# Patient Record
Sex: FEMALE | Race: WHITE | NOT HISPANIC OR LATINO | Employment: FULL TIME | URBAN - METROPOLITAN AREA
[De-identification: names, ages, dates, MRNs, and addresses within clinical notes are randomized per-mention and may not be internally consistent; named-entity substitution may affect disease eponyms.]

---

## 2017-01-05 ENCOUNTER — THERAPY VISIT (OUTPATIENT)
Dept: PHYSICAL THERAPY | Facility: CLINIC | Age: 27
End: 2017-01-05
Payer: OTHER MISCELLANEOUS

## 2017-01-05 DIAGNOSIS — M25.511 ACUTE PAIN OF RIGHT SHOULDER: Primary | ICD-10-CM

## 2017-01-05 PROCEDURE — 97110 THERAPEUTIC EXERCISES: CPT | Mod: GP | Performed by: PHYSICAL THERAPIST

## 2017-01-05 PROCEDURE — 97112 NEUROMUSCULAR REEDUCATION: CPT | Mod: GP | Performed by: PHYSICAL THERAPIST

## 2017-01-16 ENCOUNTER — THERAPY VISIT (OUTPATIENT)
Dept: PHYSICAL THERAPY | Facility: CLINIC | Age: 27
End: 2017-01-16
Payer: OTHER MISCELLANEOUS

## 2017-01-16 DIAGNOSIS — M25.511 ACUTE PAIN OF RIGHT SHOULDER: Primary | ICD-10-CM

## 2017-01-16 PROCEDURE — 97110 THERAPEUTIC EXERCISES: CPT | Mod: GP | Performed by: PHYSICAL THERAPIST

## 2017-01-16 PROCEDURE — 97112 NEUROMUSCULAR REEDUCATION: CPT | Mod: GP | Performed by: PHYSICAL THERAPIST

## 2017-02-16 ENCOUNTER — THERAPY VISIT (OUTPATIENT)
Dept: PHYSICAL THERAPY | Facility: CLINIC | Age: 27
End: 2017-02-16
Payer: COMMERCIAL

## 2017-02-16 DIAGNOSIS — M25.511 ACUTE PAIN OF RIGHT SHOULDER: ICD-10-CM

## 2017-02-16 PROCEDURE — 97110 THERAPEUTIC EXERCISES: CPT | Mod: GP | Performed by: PHYSICAL THERAPIST

## 2017-02-16 PROCEDURE — 97112 NEUROMUSCULAR REEDUCATION: CPT | Mod: GP | Performed by: PHYSICAL THERAPIST

## 2017-03-10 ENCOUNTER — THERAPY VISIT (OUTPATIENT)
Dept: PHYSICAL THERAPY | Facility: CLINIC | Age: 27
End: 2017-03-10
Payer: OTHER MISCELLANEOUS

## 2017-03-10 DIAGNOSIS — M25.511 ACUTE PAIN OF RIGHT SHOULDER: ICD-10-CM

## 2017-03-10 PROCEDURE — 97110 THERAPEUTIC EXERCISES: CPT | Mod: GP | Performed by: PHYSICAL THERAPIST

## 2017-03-10 PROCEDURE — 97112 NEUROMUSCULAR REEDUCATION: CPT | Mod: GP | Performed by: PHYSICAL THERAPIST

## 2017-10-11 ENCOUNTER — OFFICE VISIT (OUTPATIENT)
Dept: FAMILY MEDICINE | Facility: CLINIC | Age: 27
End: 2017-10-11
Payer: COMMERCIAL

## 2017-10-11 ENCOUNTER — DOCUMENTATION ONLY (OUTPATIENT)
Dept: LAB | Facility: CLINIC | Age: 27
End: 2017-10-11

## 2017-10-11 VITALS
BODY MASS INDEX: 22.5 KG/M2 | HEART RATE: 101 BPM | DIASTOLIC BLOOD PRESSURE: 85 MMHG | OXYGEN SATURATION: 95 % | WEIGHT: 140 LBS | SYSTOLIC BLOOD PRESSURE: 136 MMHG | HEIGHT: 66 IN | TEMPERATURE: 98 F

## 2017-10-11 DIAGNOSIS — Z79.899 MEDICATION MANAGEMENT: ICD-10-CM

## 2017-10-11 DIAGNOSIS — Z13.220 LIPID SCREENING: Primary | ICD-10-CM

## 2017-10-11 DIAGNOSIS — Z30.09 ENCOUNTER FOR OTHER GENERAL COUNSELING OR ADVICE ON CONTRACEPTION: ICD-10-CM

## 2017-10-11 DIAGNOSIS — Z80.3 FAMILY HISTORY OF BREAST CANCER: ICD-10-CM

## 2017-10-11 DIAGNOSIS — Z13.29 SCREENING FOR THYROID DISORDER: ICD-10-CM

## 2017-10-11 DIAGNOSIS — F33.8 SEASONAL AFFECTIVE DISORDER (H): ICD-10-CM

## 2017-10-11 DIAGNOSIS — F41.8 DEPRESSION WITH ANXIETY: Primary | ICD-10-CM

## 2017-10-11 PROCEDURE — 99214 OFFICE O/P EST MOD 30 MIN: CPT | Performed by: INTERNAL MEDICINE

## 2017-10-11 RX ORDER — FLUOXETINE 40 MG/1
40 CAPSULE ORAL DAILY
Qty: 90 CAPSULE | Refills: 1 | Status: SHIPPED | OUTPATIENT
Start: 2017-10-11 | End: 2018-07-10

## 2017-10-11 RX ORDER — BUPROPION HYDROCHLORIDE 300 MG/1
300 TABLET ORAL EVERY MORNING
Qty: 90 TABLET | Refills: 3 | Status: SHIPPED | OUTPATIENT
Start: 2017-10-11 | End: 2018-09-26

## 2017-10-11 ASSESSMENT — PATIENT HEALTH QUESTIONNAIRE - PHQ9: SUM OF ALL RESPONSES TO PHQ QUESTIONS 1-9: 0

## 2017-10-11 NOTE — NURSING NOTE
"Chief Complaint   Patient presents with     Contraception       Initial /85 (BP Location: Right arm, Cuff Size: Adult Regular)  Pulse 101  Temp 98  F (36.7  C) (Oral)  Ht 5' 6\" (1.676 m)  Wt 140 lb (63.5 kg)  SpO2 95%  Breastfeeding? No  BMI 22.6 kg/m2 Estimated body mass index is 22.6 kg/(m^2) as calculated from the following:    Height as of this encounter: 5' 6\" (1.676 m).    Weight as of this encounter: 140 lb (63.5 kg).  Medication Reconciliation: complete.  ADRIANA Muir      "

## 2017-10-11 NOTE — PROGRESS NOTES
"  SUBJECTIVE:   Cristy Gibbons is a 26 year old female who presents to clinic today for the following health issues:      Contraception options--patient would like to know more about the IUDs.    She cut down on Wellbutrin to 300 mg daily in may 2017  Has been on these medication for long time  Patient reports that when she cut down on her Wellbutrin it did not cause any major changes in her mood  She would like to see a gynecologist about IUD and had questions which were answered    Problem list and histories reviewed & adjusted, as indicated.  Additional history: as documented    Labs reviewed in EPIC    Reviewed and updated as needed this visit by clinical staff       Reviewed and updated as needed this visit by Provider         ROS:  Constitutional, neuro, ENT, endocrine, pulmonary, cardiac, gastrointestinal, genitourinary, musculoskeletal, integument and psychiatric systems are negative, except as otherwise noted.  She feels fine and is due for Pap smear  Cannot do Pap smear today as she is having her menstrual cycle    OBJECTIVE:                                                    /85 (BP Location: Right arm, Cuff Size: Adult Regular)  Pulse 101  Temp 98  F (36.7  C) (Oral)  Ht 5' 6\" (1.676 m)  Wt 140 lb (63.5 kg)  SpO2 95%  Breastfeeding? No  BMI 22.6 kg/m2  Body mass index is 22.6 kg/(m^2).      GENERAL APPEARANCE: healthy, alert and no distress  EYES: Eyes grossly normal to inspection, PERRL and conjunctivae and sclerae normal  HENT: ear canals and TM's normal and nose and mouth without ulcers or lesions  NECK: no adenopathy  RESP: lungs clear to auscultation - no rales, rhonchi or wheezes  CV: regular rates and rhythm, normal S1 S2, no S3                 ASSESSMENT/PLAN:                                                      Cristy was seen today for contraception.    Diagnoses and all orders for this visit:    Depression with anxiety  -     buPROPion (WELLBUTRIN XL) 300 MG 24 hr tablet; Take 1 " tablet (300 mg) by mouth every morning  -     FLUoxetine (PROZAC) 40 MG capsule; Take 1 capsule (40 mg) by mouth daily  Patient reports she has been on high-dose for many years  She doesn't experience any side effects  Recently she cut down her dose to 300 mg of Wellbutrin instead of 450  We also discussed about loading fluoxetine to 20 mg down the road  We will start with 30 mg and then go to 20  Also at some point I would like to cut back on her Wellbutrin to 150 mg  She would let me know when she feels ready  She will continue current management and she is not experiencing any side effects and it is working well for her depression    Seasonal affective disorder (H)  -     buPROPion (WELLBUTRIN XL) 300 MG 24 hr tablet; Take 1 tablet (300 mg) by mouth every morning  -     FLUoxetine (PROZAC) 40 MG capsule; Take 1 capsule (40 mg) by mouth daily  See the note above    Medication management  -     Creatinine  -     ALT    Screening for thyroid disorder  -     TSH with free T4 reflex    Family history of breast cancer    Encounter for other general counseling or advice on contraception  -     OB/GYN REFERRAL  Questions were answered regarding IUD  Referred to OB GYN for placement  Due for Pap smear and she will reschedule with gynecologist    Other orders  -     DEPRESSION ACTION PLAN (DAP)    Preventive health counseling was also done.  Will get flu shot at work      Follow up with Provider - 6 months   Patient Instructions   Make appointment with [facility] for placing IUD and pap test  Follow up in 6 months  Seek sooner medical attention if there is any worsening of symptoms or problems.        Abby Vinson MD  [facility]

## 2017-10-11 NOTE — PROGRESS NOTES
Pt left without having blood drawn after appointment. Testing has been cancelled. If testing is still needed you will need to replace orders as future and contact patient.     Thank you,  Lab

## 2017-10-11 NOTE — PROGRESS NOTES
Future labs ordered.  Have patient schedule those at her earliest convenience  Dr.Nasima Alisson MD

## 2017-10-11 NOTE — PATIENT INSTRUCTIONS
Make appointment with [facility] for placing IUD and pap test  Follow up in 6 months  Seek sooner medical attention if there is any worsening of symptoms or problems.

## 2017-10-11 NOTE — MR AVS SNAPSHOT
After Visit Summary   10/11/2017    Cristy Gibbons    MRN: [id number]           Patient Information     Date Of Birth          1990        Visit Information        Provider Department      10/11/2017 9:00 AM Abby Vinson MD [facility]        Today's Diagnoses     Depression with anxiety    -  1    Seasonal affective disorder (H)        Medication management        Screening for thyroid disorder        Family history of breast cancer        Encounter for other general counseling or advice on contraception          Care Instructions    Make appointment with [facility] for placing IUD and pap test  Follow up in 6 months  Seek sooner medical attention if there is any worsening of symptoms or problems.            Follow-ups after your visit        Additional Services     OB/GYN REFERRAL       Your provider has referred you to:  G: [facility] [phone]  [email or web address]    Please be aware that coverage of these services is subject to the terms and limitations of your health insurance plan.  Call member services at your health plan with any benefit or coverage questions.      Please bring the following with you to your appointment:    (1) Any X-Rays, CTs or MRIs which have been performed.  Contact the facility where they were done to arrange for  prior to your scheduled appointment.   (2) List of current medications   (3) This referral request   (4) Any documents/labs given to you for this referral                  Who to contact     If you have questions or need follow up information about today's clinic visit or your schedule please contact [facility] directly at [phone].  Normal or non-critical lab and imaging results will be communicated to you by MyChart, letter or phone within 4 business days after the clinic has received the results. If you do not hear from us within 7 days,  "please contact the clinic through [employer] or phone. If you have a critical or abnormal lab result, we will notify you by phone as soon as possible.  Submit refill requests through [employer] or call your pharmacy and they will forward the refill request to us. Please allow 3 business days for your refill to be completed.          Additional Information About Your Visit        [employer] Information     [employer] lets you send messages to your doctor, view your test results, renew your prescriptions, schedule appointments and more. To sign up, go to [email or web address] . Click on \"Log in\" on the left side of the screen, which will take you to the Welcome page. Then click on \"Sign up Now\" on the right side of the page.     You will be asked to enter the access code listed below, as well as some personal information. Please follow the directions to create your username and password.     Your access code is: [email or web address]  Expires: 2018  9:09 AM     Your access code will  in 90 days. If you need help or a new code, please call your [place] clinic or [phone].        Care EveryWhere ID     This is your Care EveryWhere ID. This could be used by other organizations to access your [place] medical records  [id number]        Your Vitals Were     Pulse Temperature Height Pulse Oximetry Breastfeeding? BMI (Body Mass Index)    101 98  F (36.7  C) (Oral) 5' 6\" (1.676 m) 95% No 22.6 kg/m2       Blood Pressure from Last 3 Encounters:   10/11/17 136/85   16 121/78   06/10/16 117/70    Weight from Last 3 Encounters:   10/11/17 140 lb (63.5 kg)   16 143 lb (64.9 kg)   06/10/16 137 lb 11.2 oz (62.5 kg)              We Performed the Following     ALT     Creatinine     DEPRESSION ACTION PLAN (DAP)     OB/GYN REFERRAL     TSH with free T4 reflex          Today's Medication Changes          These changes are accurate as of: 10/11/17  9:09 AM.  If you have any questions, ask your nurse or doctor.             "   These medicines have changed or have updated prescriptions.        Dose/Directions    buPROPion 300 MG 24 hr tablet   Commonly known as:  WELLBUTRIN XL   This may have changed:  Another medication with the same name was removed. Continue taking this medication, and follow the directions you see here.   Used for:  Seasonal affective disorder (H), Depression with anxiety   Changed by:  Abby Vinson MD        Dose:  300 mg   Take 1 tablet (300 mg) by mouth every morning   Quantity:  90 tablet   Refills:  3            Where to get your medicines      These medications were sent to [facility], [place] - [address]  [address], [place] [address]     Phone:  [phone]     buPROPion 300 MG 24 hr tablet    FLUoxetine 40 MG capsule                Primary Care Provider Office Phone # Fax #    Abby Vinson [phone] [address]  [facility] [address]        Equal Access to Services     [facility]: Hadii thang ku hadasho Mouna, waaxda luqadaha, qaybta kaalmada adeegyada, waxay sandyin kinsey ortiz . So [facility] [phone].    ATENCIÓN: Si habla español, tiene a burdick disposición servicios gratuitos de asistencia lingüística. Llame al [phone].    We comply with applicable federal civil rights laws and [place] laws. We do not discriminate on the basis of race, color, national origin, age, disability, sex, sexual orientation, or gender identity.            Thank you!     Thank you for choosing [facility]  for your care. Our goal is always to provide you with excellent care. Hearing back from our patients is one way we can continue to improve our services. Please take a few minutes to complete the written survey that you may receive in the mail after your visit with us. Thank you!             Your Updated Medication List - Protect others around you: Learn how to safely use, store  and throw away your medicines at www.disposemymeds.org.          This list is accurate as of: 10/11/17  9:09 AM.  Always use your most recent med list.                   Brand Name Dispense Instructions for use Diagnosis    buPROPion 300 MG 24 hr tablet    WELLBUTRIN XL    90 tablet    Take 1 tablet (300 mg) by mouth every morning    Seasonal affective disorder (H), Depression with anxiety       FLUoxetine 40 MG capsule    PROzac    90 capsule    Take 1 capsule (40 mg) by mouth daily    Seasonal affective disorder (H), Depression with anxiety

## 2017-10-11 NOTE — LETTER
My Depression Action Plan  Name: Cristy Gibbons   Date of Birth 1990  Date: 10/11/2017    My doctor: Abby Vinson   My clinic: [address] [facility] [address] [phone]          GREEN    ZONE   Good Control    What it looks like:     Things are going generally well. You have normal up s and down s. You may even feel depressed from time to time, but bad moods usually last less than a day.   What you need to do:  1. Continue to care for yourself (see self care plan)  2. Check your depression survival kit and update it as needed  3. Follow your physician s recommendations including any medication.  4. Do not stop taking medication unless you consult with your physician first.           YELLOW         ZONE Getting Worse    What it looks like:     Depression is starting to interfere with your life.     It may be hard to get out of bed; you may be starting to isolate yourself from others.    Symptoms of depression are starting to last most all day and this has happened for several days.     You may have suicidal thoughts but they are not constant.   What you need to do:     1. Call your care team, your response to treatment will improve if you keep your care team informed of your progress. Yellow periods are signs an adjustment may need to be made.     2. Continue your self-care, even if you have to fake it!    3. Talk to someone in your support network    4. Open up your depression survival kit           RED    ZONE Medical Alert - Get Help    What it looks like:     Depression is seriously interfering with your life.     You may experience these or other symptoms: You can t get out of bed most days, can t work or engage in other necessary activities, you have trouble taking care of basic hygiene, or basic responsibilities, thoughts of suicide or death that will not go away, self-injurious behavior.     What you need to do:  1. Call your care team and  request a same-day appointment. If they are not available (weekends or after hours) call your local crisis line, emergency room or 911.      Electronically signed by: Abby Vinson, October 11, 2017    Depression Self Care Plan / Survival Kit    Self-Care for Depression  Here s the deal. Your body and mind are really not as separate as most people think.  What you do and think affects how you feel and how you feel influences what you do and think. This means if you do things that people who feel good do, it will help you feel better.  Sometimes this is all it takes.  There is also a place for medication and therapy depending on how severe your depression is, so be sure to consult with your medical provider and/ or Behavioral Health Consultant if your symptoms are worsening or not improving.     In order to better manage my stress, I will:    Exercise  Get some form of exercise, every day. This will help reduce pain and release endorphins, the  feel good  chemicals in your brain. This is almost as good as taking antidepressants!  This is not the same as joining a gym and then never going! (they count on that by the way ) It can be as simple as just going for a walk or doing some gardening, anything that will get you moving.      Hygiene   Maintain good hygiene (Get out of bed in the morning, Make your bed, Brush your teeth, Take a shower, and Get dressed like you were going to work, even if you are unemployed).  If your clothes don't fit try to get ones that do.    Diet  I will strive to eat foods that are good for me, drink plenty of water, and avoid excessive sugar, caffeine, alcohol, and other mood-altering substances.  Some foods that are helpful in depression are: complex carbohydrates, B vitamins, flaxseed, fish or fish oil, fresh fruits and vegetables.    Psychotherapy  I agree to participate in Individual Therapy (if recommended).    Medication  If prescribed medications, I agree to take them.  Missing  doses can result in serious side effects.  I understand that drinking alcohol, or other illicit drug use, may cause potential side effects.  I will not stop my medication abruptly without first discussing it with my provider.    Staying Connected With Others  I will stay in touch with my friends, family members, and my primary care provider/team.    Use your imagination  Be creative.  We all have a creative side; it doesn t matter if it s oil painting, sand castles, or mud pies! This will also kick up the endorphins.    Witness Beauty  (AKA stop and smell the roses) Take a look outside, even in mid-winter. Notice colors, textures. Watch the squirrels and birds.     Service to others  Be of service to others.  There is always someone else in need.  By helping others we can  get out of ourselves  and remember the really important things.  This also provides opportunities for practicing all the other parts of the program.    Humor  Laugh and be silly!  Adjust your TV habits for less news and crime-drama and more comedy.    Control your stress  Try breathing deep, massage therapy, biofeedback, and meditation. Find time to relax each day.     My support system    Clinic Contact:  Phone number:    Contact 1:  Phone number:    Contact 2:  Phone number:    [facility]/:  Phone number:    Therapist:  Phone number:    Local crisis center:    Phone number:    Other community support:  Phone number:

## 2017-10-12 ENCOUNTER — OFFICE VISIT (OUTPATIENT)
Dept: OBGYN | Facility: CLINIC | Age: 27
End: 2017-10-12
Payer: COMMERCIAL

## 2017-10-12 VITALS
WEIGHT: 141 LBS | SYSTOLIC BLOOD PRESSURE: 121 MMHG | BODY MASS INDEX: 22.66 KG/M2 | DIASTOLIC BLOOD PRESSURE: 80 MMHG | HEIGHT: 66 IN

## 2017-10-12 DIAGNOSIS — Z30.09 GENERAL COUNSELING AND ADVICE ON CONTRACEPTIVE MANAGEMENT: Primary | ICD-10-CM

## 2017-10-12 PROCEDURE — 99203 OFFICE O/P NEW LOW 30 MIN: CPT | Performed by: OBSTETRICS & GYNECOLOGY

## 2017-10-12 RX ORDER — MISOPROSTOL 200 UG/1
400 TABLET ORAL ONCE
Qty: 2 TABLET | Refills: 0 | Status: SHIPPED | OUTPATIENT
Start: 2017-10-12 | End: 2017-10-12

## 2017-10-12 RX ORDER — IBUPROFEN 600 MG/1
600 TABLET, FILM COATED ORAL EVERY 6 HOURS PRN
Qty: 90 TABLET | Refills: 3 | Status: SHIPPED | OUTPATIENT
Start: 2017-10-12 | End: 2018-01-08

## 2017-10-12 ASSESSMENT — ANXIETY QUESTIONNAIRES
2. NOT BEING ABLE TO STOP OR CONTROL WORRYING: NOT AT ALL
7. FEELING AFRAID AS IF SOMETHING AWFUL MIGHT HAPPEN: NOT AT ALL
1. FEELING NERVOUS, ANXIOUS, OR ON EDGE: NOT AT ALL
5. BEING SO RESTLESS THAT IT IS HARD TO SIT STILL: NOT AT ALL
6. BECOMING EASILY ANNOYED OR IRRITABLE: NOT AT ALL
GAD7 TOTAL SCORE: 0
3. WORRYING TOO MUCH ABOUT DIFFERENT THINGS: NOT AT ALL
IF YOU CHECKED OFF ANY PROBLEMS ON THIS QUESTIONNAIRE, HOW DIFFICULT HAVE THESE PROBLEMS MADE IT FOR YOU TO DO YOUR WORK, TAKE CARE OF THINGS AT HOME, OR GET ALONG WITH OTHER PEOPLE: NOT DIFFICULT AT ALL

## 2017-10-12 ASSESSMENT — PATIENT HEALTH QUESTIONNAIRE - PHQ9
5. POOR APPETITE OR OVEREATING: NOT AT ALL
SUM OF ALL RESPONSES TO PHQ QUESTIONS 1-9: 0

## 2017-10-12 NOTE — MR AVS SNAPSHOT
"              After Visit Summary   10/12/2017    Cristy Gibbons    MRN: [id number]           Patient Information     Date Of Birth          1990        Visit Information        Provider Department      10/12/2017 9:00 AM Vale Brown MD [facility] [place]        Today's Diagnoses     General counseling and advice on contraceptive management    -  1      Care Instructions    Please return and don't forget to take medications prior to appointment.          Follow-ups after your visit        Future tests that were ordered for you today     Open Future Orders        Priority Expected Expires Ordered    TSH with free T4 reflex Routine 10/12/2017 12/29/2017 10/11/2017    Creatinine Routine 10/12/2017 12/29/2017 10/11/2017    ALT Routine 10/12/2017 12/29/2017 10/11/2017            Who to contact     If you have questions or need follow up information about today's clinic visit or your schedule please contact [facility] [place] directly at [phone].  Normal or non-critical lab and imaging results will be communicated to you by [employer], letter or phone within 4 business days after the clinic has received the results. If you do not hear from us within 7 days, please contact the clinic through [employer] or phone. If you have a critical or abnormal lab result, we will notify you by phone as soon as possible.  Submit refill requests through [employer] or call your pharmacy and they will forward the refill request to us. Please allow 3 business days for your refill to be completed.          Additional Information About Your Visit        [employer] Information     [employer] lets you send messages to your doctor, view your test results, renew your prescriptions, schedule appointments and more. To sign up, go to [email or web address] . Click on \"Log in\" on the left side of the screen, which will take you to the Welcome page. Then click on \"Sign up Now\" on the right side of the page. " "    You will be asked to enter the access code listed below, as well as some personal information. Please follow the directions to create your username and password.     Your access code is: [email or web address]  Expires: 2018  9:09 AM     Your access code will  in 90 days. If you need help or a new code, please call your [place] clinic or [phone].        Care EveryWhere ID     This is your Care EveryWhere ID. This could be used by other organizations to access your [place] medical records  [id number]        Your Vitals Were     Height Last Period BMI (Body Mass Index)             5' 6\" (1.676 m) 10/09/2017 (Exact Date) 22.76 kg/m2          Blood Pressure from Last 3 Encounters:   10/12/17 121/80   10/11/17 136/85   16 121/78    Weight from Last 3 Encounters:   10/12/17 141 lb (64 kg)   10/11/17 140 lb (63.5 kg)   16 143 lb (64.9 kg)              Today, you had the following     No orders found for display         Today's Medication Changes          These changes are accurate as of: 10/12/17  9:48 AM.  If you have any questions, ask your nurse or doctor.               Start taking these medicines.        Dose/Directions    ibuprofen 600 MG tablet   Commonly known as:  ADVIL/MOTRIN   Used for:  General counseling and advice on contraceptive management   Started by:  Vale Brown MD        Dose:  600 mg   Take 1 tablet (600 mg) by mouth every 6 hours as needed for moderate pain Take one tablet 30 minutes prior to the procedure.   Quantity:  90 tablet   Refills:  3       misoprostol 200 MCG tablet   Commonly known as:  CYTOTEC   Used for:  General counseling and advice on contraceptive management   Started by:  Vale Brown MD        Dose:  400 mcg   Take 2 tablets (400 mcg) by mouth once for 1 dose Please take this medication 2 hours prior to the procedure.   Quantity:  2 tablet   Refills:  0            Where to get your medicines      These medications were sent " to [facility], [place] - [address]  [address], [address], [place] [address]     Phone:  [phone]     ibuprofen 600 MG tablet    misoprostol 200 MCG tablet                Primary Care Provider Office Phone # Fax #    Abby HERRMANN MD [facility] [phone] [address]  [place]                [place] [address]        Equal Access to Services     HANNA MCNULTY : Hadii aad ku hadasho Soomaali, waaxda luqadaha, qaybta kaalmada adeegyada, waxay idiin hayaan adeeg kharash la'aan . So [facility] [phone].    ATENCIÓN: Si habla español, tiene a burdick disposición servicios gratuitos de asistencia lingüística. [facility] [phone].    We comply with applicable federal civil rights laws and [place] laws. We do not discriminate on the basis of race, color, national origin, age, disability, sex, sexual orientation, or gender identity.            Thank you!     Thank you for choosing [facility]  for your care. Our goal is always to provide you with excellent care. Hearing back from our patients is one way we can continue to improve our services. Please take a few minutes to complete the written survey that you may receive in the mail after your visit with us. Thank you!             Your Updated Medication List - Protect others around you: Learn how to safely use, store and throw away your medicines at www.disposemymeds.org.          This list is accurate as of: 10/12/17  9:48 AM.  Always use your most recent med list.                   Brand Name Dispense Instructions for use Diagnosis    buPROPion 300 MG 24 hr tablet    WELLBUTRIN XL    90 tablet    Take 1 tablet (300 mg) by mouth every morning    Seasonal affective disorder (H), Depression with anxiety       FLUoxetine 40 MG capsule    PROzac    90 capsule    Take 1 capsule (40 mg) by mouth daily    Seasonal affective disorder (H), Depression with anxiety       ibuprofen 600 MG tablet     ADVIL/MOTRIN    90 tablet    Take 1 tablet (600 mg) by mouth every 6 hours as needed for moderate pain Take one tablet 30 minutes prior to the procedure.    General counseling and advice on contraceptive management       misoprostol 200 MCG tablet    CYTOTEC    2 tablet    Take 2 tablets (400 mcg) by mouth once for 1 dose Please take this medication 2 hours prior to the procedure.    General counseling and advice on contraceptive management

## 2017-10-12 NOTE — PROGRESS NOTES
I was asked to see Cristy by Dr. Abby Vinson for consultation about long acting reversible contraception    SUBJECTIVE:                                                   Cristy Gibbons is a 26 year old female who presents to clinic today for the following health issue(s):  Patient presents with:  Consult: IUD      Additional information: Wants to know difference between copper and hormonal.    HPI:  Cristy presents for consultation. She has used combined OCP in the past and she does not want any more hormonal exposure due to her family history of breast cancer. She also has a diagnosis of depression/anxiety and is currently being weaned down on Wellbutrin. She is currently on her menses. Her menses are monthly and of moderate flow. They occur every 28 days. She states that she would like the reassurance of seeing monthly menses every month. She has never had an intrauterine device. She has never been pregnant.    Patient's last menstrual period was 10/09/2017 (exact date)..   Patient is sexually active, G0  Using condoms for contraception.    reports that she has never smoked. She has never used smokeless tobacco.      STD testing offered?  Declined    Health maintenance updated:  yes    Today's PHQ-2 Score:   PHQ-2 ( 1999 Pfizer) 10/12/2017   Q1: Little interest or pleasure in doing things 0   Q2: Feeling down, depressed or hopeless 0   PHQ-2 Score 0     Today's PHQ-9 Score:   PHQ-9 SCORE 10/12/2017   Total Score -   Total Score 0     Today's GURPREET-7 Score:   GURPREET-7 SCORE 10/12/2017   Total Score 0       Problem list and histories reviewed & adjusted, as indicated.  Additional history: as documented.    Patient Active Problem List   Diagnosis     Seasonal affective disorder (H)     CARDIOVASCULAR SCREENING; LDL GOAL LESS THAN 160     Depression with anxiety     Acute pain of right shoulder     Family history of breast cancer     Past Surgical History:   Procedure Laterality Date     APPENDECTOMY  1997      Social  "History   Substance Use Topics     Smoking status: Never Smoker     Smokeless tobacco: Never Used     Alcohol use 0.0 oz/week     0 Standard drinks or equivalent per week      Comment: 1 x per month       Problem (# of Occurrences) Relation (Name,Age of Onset)    Breast Cancer (2) Maternal Grandmother, Paternal Grandmother    CANCER (2) Maternal Grandmother, Paternal Grandmother    Hypertension (1) Mother       Negative family history of: DIABETES, Thyroid Disease, Glaucoma, Macular Degeneration            Current Outpatient Prescriptions   Medication Sig     misoprostol (CYTOTEC) 200 MCG tablet Take 2 tablets (400 mcg) by mouth once for 1 dose Please take this medication 2 hours prior to the procedure.     ibuprofen (ADVIL/MOTRIN) 600 MG tablet Take 1 tablet (600 mg) by mouth every 6 hours as needed for moderate pain Take one tablet 30 minutes prior to the procedure.     buPROPion (WELLBUTRIN XL) 300 MG 24 hr tablet Take 1 tablet (300 mg) by mouth every morning     FLUoxetine (PROZAC) 40 MG capsule Take 1 capsule (40 mg) by mouth daily     No current facility-administered medications for this visit.      Allergies   Allergen Reactions     No Known Allergies        ROS:  12 point review of systems negative other than symptoms noted below.    OBJECTIVE:     /80  Ht 5' 6\" (1.676 m)  Wt 141 lb (64 kg)  LMP 10/09/2017 (Exact Date)  BMI 22.76 kg/m2  Body mass index is 22.76 kg/(m^2).    Exam:  Constitutional:  Appearance: Well nourished, well developed alert, in no acute distress  Chest:  Respiratory Effort:  Breathing unlabored  Cardiovascular: Heart: no peripheral edema  Neurologic/Psychiatric:  Mental Status:  Oriented X3    Pelvic Exam: deferred today.    In-Clinic Test Results:  No results found for this or any previous visit (from the past 24 hour(s)).    ASSESSMENT/PLAN:                                                        ICD-10-CM    1. General counseling and advice on contraceptive management Z30.09 " misoprostol (CYTOTEC) 200 MCG tablet     ibuprofen (ADVIL/MOTRIN) 600 MG tablet     We discussed the different intrauterine devices. We discussed placement, mechanism of action as well as side effects and benefits of each device. Cristy was given the option to attempt a placement today as she is on her menses or to return with pre-medication with misoprostol and ibuprofen. She opted for pre-medication. She was advised to use condoms if she is sexually active between now and placement. She opts for the copper intrauterine device which can be placed up to 5 days after unprotected intercourse.   At the time of her IUD placement, we will perform a pap smear.    Patient Instructions   Please return and don't forget to take medications prior to appointment.          Vale Brown MD  [facility] [place]

## 2017-10-13 ASSESSMENT — ANXIETY QUESTIONNAIRES: GAD7 TOTAL SCORE: 0

## 2017-10-24 ENCOUNTER — OFFICE VISIT (OUTPATIENT)
Dept: OBGYN | Facility: CLINIC | Age: 27
End: 2017-10-24
Payer: COMMERCIAL

## 2017-10-24 VITALS
DIASTOLIC BLOOD PRESSURE: 80 MMHG | SYSTOLIC BLOOD PRESSURE: 122 MMHG | BODY MASS INDEX: 22.98 KG/M2 | HEIGHT: 66 IN | WEIGHT: 143 LBS

## 2017-10-24 DIAGNOSIS — Z12.4 SCREENING FOR CERVICAL CANCER: ICD-10-CM

## 2017-10-24 DIAGNOSIS — Z30.430 ENCOUNTER FOR IUD INSERTION: Primary | ICD-10-CM

## 2017-10-24 DIAGNOSIS — Z97.5 PRESENCE OF INTRAUTERINE CONTRACEPTIVE DEVICE: ICD-10-CM

## 2017-10-24 DIAGNOSIS — Z01.812 PRE-PROCEDURE LAB EXAM: ICD-10-CM

## 2017-10-24 LAB — BETA HCG QUAL IFA URINE: NEGATIVE

## 2017-10-24 PROCEDURE — G0145 SCR C/V CYTO,THINLAYER,RESCR: HCPCS | Performed by: OBSTETRICS & GYNECOLOGY

## 2017-10-24 PROCEDURE — 84703 CHORIONIC GONADOTROPIN ASSAY: CPT | Performed by: OBSTETRICS & GYNECOLOGY

## 2017-10-24 PROCEDURE — 58300 INSERT INTRAUTERINE DEVICE: CPT | Performed by: OBSTETRICS & GYNECOLOGY

## 2017-10-24 RX ORDER — COPPER 313.4 MG/1
1 INTRAUTERINE DEVICE INTRAUTERINE ONCE
Qty: 1 EACH
Start: 2017-10-24 | End: 2017-10-24

## 2017-10-24 NOTE — MR AVS SNAPSHOT
"              After Visit Summary   10/24/2017    Cristy Gibbons    MRN: [id number]           Patient Information     Date Of Birth          1990        Visit Information        Provider Department      10/24/2017 8:30 AM Vale Brown MD [facility] [place]        Today's Diagnoses     Encounter for IUD insertion    -  1    Pre-procedure lab exam        Presence of intrauterine contraceptive device        Screening for cervical cancer           Follow-ups after your visit        Who to contact     If you have questions or need follow up information about today's clinic visit or your schedule please contact [facility] [place] directly at [phone].  Normal or non-critical lab and imaging results will be communicated to you by MyChart, letter or phone within 4 business days after the clinic has received the results. If you do not hear from us within 7 days, please contact the clinic through MyChart or phone. If you have a critical or abnormal lab result, we will notify you by phone as soon as possible.  Submit refill requests through [email or web address] or call your pharmacy and they will forward the refill request to us. Please allow 3 business days for your refill to be completed.          Additional Information About Your Visit        MyChart Information     [email or web address] lets you send messages to your doctor, view your test results, renew your prescriptions, schedule appointments and more. To sign up, go to [email or web address] . Click on \"Log in\" on the left side of the screen, which will take you to the Welcome page. Then click on \"Sign up Now\" on the right side of the page.     You will be asked to enter the access code listed below, as well as some personal information. Please follow the directions to create your username and password.     Your access code is: [email or web address]  Expires: 2018  9:09 AM     Your access code will  in 90 days. If you need help or a new " "code, please call your [place] clinic or [phone].        Care EveryWhere ID     This is your Care EveryWhere ID. This could be used by other organizations to access your [place] medical records  [id number]        Your Vitals Were     Height Last Period Breastfeeding? BMI (Body Mass Index)          5' 6\" (1.676 m) 10/09/2017 (Exact Date) No 23.08 kg/m2         Blood Pressure from Last 3 Encounters:   10/24/17 122/80   10/12/17 121/80   10/11/17 136/85    Weight from Last 3 Encounters:   10/24/17 143 lb (64.9 kg)   10/12/17 141 lb (64 kg)   10/11/17 140 lb (63.5 kg)              We Performed the Following     Beta HCG qual IFA urine - [facility] and [facility]     INSERTION INTRAUTERINE DEVICE     INTRAUT COPPER CONTRACEPTIVE     Pap imaged thin layer screen reflex to HPV if ASCUS - recommend age 25 - 29          Today's Medication Changes          These changes are accurate as of: 10/24/17  9:07 AM.  If you have any questions, ask your nurse or doctor.               Start taking these medicines.        Dose/Directions    paragard intrauterine copper   Used for:  Encounter for IUD insertion   Started by:  Vale Brown MD        Dose:  1 each   1 each by Intrauterine route once for 1 dose   Quantity:  1 each   Refills:  0            Where to get your medicines      Some of these will need a paper prescription and others can be bought over the counter.  Ask your nurse if you have questions.     You don't need a prescription for these medications     paragard intrauterine copper                Primary Care Provider Office Phone # Fax #    Abby Vinson [phone] [address] [facility] [address]  [place]                [place] [address]        Equal Access to Services     [facility]: Hadii thang Freire, robb montenegro, qaybta deepthi bonner. So [facility] [place] [phone].    ATENCIÓN: Si habla español, tiene a burdick disposición servicios " saida de asistencia lingüística. Ellie chau [phone].    We comply with applicable federal civil rights laws and [place] laws. We do not discriminate on the basis of race, color, national origin, age, disability, sex, sexual orientation, or gender identity.            Thank you!     Thank you for choosing [facility] [place]  for your care. Our goal is always to provide you with excellent care. Hearing back from our patients is one way we can continue to improve our services. Please take a few minutes to complete the written survey that you may receive in the mail after your visit with us. Thank you!             Your Updated Medication List - Protect others around you: Learn how to safely use, store and throw away your medicines at www.disposemymeds.org.          This list is accurate as of: 10/24/17  9:07 AM.  Always use your most recent med list.                   Brand Name Dispense Instructions for use Diagnosis    buPROPion 300 MG 24 hr tablet    WELLBUTRIN XL    90 tablet    Take 1 tablet (300 mg) by mouth every morning    Seasonal affective disorder (H), Depression with anxiety       FLUoxetine 40 MG capsule    PROzac    90 capsule    Take 1 capsule (40 mg) by mouth daily    Seasonal affective disorder (H), Depression with anxiety       ibuprofen 600 MG tablet    ADVIL/MOTRIN    90 tablet    Take 1 tablet (600 mg) by mouth every 6 hours as needed for moderate pain Take one tablet 30 minutes prior to the procedure.    General counseling and advice on contraceptive management       paragard intrauterine copper     1 each    1 each by Intrauterine route once for 1 dose    Encounter for IUD insertion

## 2017-10-24 NOTE — PROGRESS NOTES
INDICATIONS:                                                      Is a pregnancy test required: Yes.  Was it positive or negative?  Negative  Was a consent obtained?  Yes    Cristy Gibbons is a 26 year old female,   who presents for insertion of an IUD. The risks, benefits and alternatives of IUD insertion were discussed in detail previously. She also has reviewed the product brochure.  She has elected to go ahead with the insertion  today and her questions were answered. Consent was signed. Her LMP began on 10/9/2017 and was normal in duration and amount of flow. A pregnancy test was performed today:  Yes. Of not she had unprotected intercourse on Thursday and took the morning after pill. She is aware that we are on the cusp of paragard being used as emergency contraception. She understands that if she has missed menses she should take a UPT at home and present for Paragard removal. She still desires to proceed.    Today's PHQ-2 Score:   PHQ-2 (  Pfizer) 10/12/2017   Q1: Little interest or pleasure in doing things 0   Q2: Feeling down, depressed or hopeless 0   PHQ-2 Score 0       PROCEDURE:                                                      The pelvic exam revealed normal external genitalia. On bimanual exam the uterus was Anteverted and normal in size with no tenderness present. A speculum was inserted into the vagina and the cervix was visualized. The cervix was prepped with Betadine . The anterior lip of the cervix was grasped with a single toothed tenaculum. The uterus sounded to 7 cm. A Paragard IUD was then inserted without difficulty. The string was cut to 3 cm.  The patient experienced a mild amount of cramping.    POST PROCEDURE:                                                      She  tolerated the procedure well. There were no complications. Patient was discharged in stable condition.    Return to clinic in 6 weeks for IUD check.  Call if severe cramping, fever, abnormal bleeding,  abnormal discharge or pelvic pain develop.  Patient was counseled about the chance of irregular bleeding.    Vale Brown MD

## 2017-10-24 NOTE — LETTER
October 30, 2017      Cristy Gibbons  [address]   [place] [place] [facility] [address]    Dear ,      I am happy to inform you that your recent cervical cancer screening test (PAP smear) was normal.      Preventative screenings such as this help to ensure your health for years to come. You should repeat a pap smear in 3 years, unless otherwise directed.      You will still need to return to the clinic every year for your annual exam and other preventive tests.     Please contact the clinic at [phone] if you have further questions.       Sincerely,      Vale Brown MD/lore

## 2017-10-26 LAB
COPATH REPORT: NORMAL
PAP: NORMAL

## 2018-01-08 ENCOUNTER — OFFICE VISIT (OUTPATIENT)
Dept: OBGYN | Facility: CLINIC | Age: 28
End: 2018-01-08
Payer: COMMERCIAL

## 2018-01-08 VITALS
WEIGHT: 146 LBS | SYSTOLIC BLOOD PRESSURE: 118 MMHG | BODY MASS INDEX: 23.46 KG/M2 | DIASTOLIC BLOOD PRESSURE: 66 MMHG | HEART RATE: 72 BPM | HEIGHT: 66 IN

## 2018-01-08 DIAGNOSIS — Z30.431 IUD CHECK UP: Primary | ICD-10-CM

## 2018-01-08 PROCEDURE — 99213 OFFICE O/P EST LOW 20 MIN: CPT | Performed by: OBSTETRICS & GYNECOLOGY

## 2018-01-08 RX ORDER — COPPER 313.4 MG/1
1 INTRAUTERINE DEVICE INTRAUTERINE ONCE
COMMUNITY
End: 2018-06-19

## 2018-01-08 NOTE — PROGRESS NOTES
SUBJECTIVE:                                                   Cristy Gibbons is a 27 year old female who presents to clinic today for the following health issue(s):  Patient presents with:  IUD: Here for IUD check      Additional information: Paragard placed 10/24/2017    HPI:  Doing well with IUD in place. States now she has 4 days of spotting followed by 5 days of menses requiring two pads per day. She has more cramping than she did before but does not use pain medication for it.     Patient's last menstrual period was 2018..   Patient is sexually active, .  Using IUD for contraception.    reports that she has never smoked. She has never used smokeless tobacco.      STD testing offered?  Declined    Health maintenance updated:  yes    Today's PHQ-2 Score:   PHQ-2 (  [employer]) 10/12/2017   Q1: Little interest or pleasure in doing things 0   Q2: Feeling down, depressed or hopeless 0   PHQ-2 Score 0     Today's PHQ-9 Score:   PHQ-9 SCORE 10/12/2017   Total Score -   Total Score 0     Today's GURPREET-7 Score:   GURPREET-7 SCORE 10/12/2017   Total Score 0       Problem list and histories reviewed & adjusted, as indicated.  Additional history: as documented.    Patient Active Problem List   Diagnosis     Seasonal affective disorder (H)     CARDIOVASCULAR SCREENING; LDL GOAL LESS THAN 160     Depression with anxiety     Acute pain of right shoulder     Family history of breast cancer     Presence of intrauterine contraceptive device     Past Surgical History:   Procedure Laterality Date     APPENDECTOMY        Social History   Substance Use Topics     Smoking status: Never Smoker     Smokeless tobacco: Never Used     Alcohol use 0.0 oz/week     0 Standard drinks or equivalent per week      Comment: 1 x per month       Problem (# of Occurrences) Relation (Name,Age of Onset)    Breast Cancer (2) Maternal Grandmother, Paternal Grandmother    CANCER (2) Maternal Grandmother, Paternal Grandmother     "Hypertension (1) Mother       Negative family history of: DIABETES, Thyroid Disease, Glaucoma, Macular Degeneration            Current Outpatient Prescriptions   Medication Sig     paragard intrauterine copper 1 each by Intrauterine route once     buPROPion (WELLBUTRIN XL) 300 MG 24 hr tablet Take 1 tablet (300 mg) by mouth every morning     FLUoxetine (PROZAC) 40 MG capsule Take 1 capsule (40 mg) by mouth daily     No current facility-administered medications for this visit.      Allergies   Allergen Reactions     No Known Allergies        ROS:  12 point review of systems negative other than symptoms noted below.    OBJECTIVE:     /66  Pulse 72  Ht 5' 6\" (1.676 m)  Wt 146 lb (66.2 kg)  LMP 01/01/2018  Breastfeeding? No  BMI 23.57 kg/m2  Body mass index is 23.57 kg/(m^2).    Exam:  Constitutional:  Appearance: Well nourished, well developed alert, in no acute distress  Skin:General Inspection:  No rashes present, no lesions present, no areas of discoloration; Genitalia and Groin:  No rashes present, no lesions present, no areas of discoloration, no masses present.  Neurologic/Psychiatric:  Mental Status:  Oriented X3   Cervix: no lesions, no discharge and moderate menses, IUD strings seen    In-Clinic Test Results:  No results found for this or any previous visit (from the past 24 hour(s)).    ASSESSMENT/PLAN:                                                        ICD-10-CM    1. IUD check up Z30.431      IUD strings seen and of appropriate length. Discussed that bleeding profile is within the range of normal. Will continue to monitor at this point. Discussed doing a trial of doxycycline if bleeding worsens or becomes more prolonged than it is now.    Vale Brown MD  [facility]  "

## 2018-01-08 NOTE — MR AVS SNAPSHOT
"              After Visit Summary   2018    Cristy Gibbons    MRN: [id number]           Patient Information     Date Of Birth          1990        Visit Information        Provider Department      2018 11:00 AM Vale Brown MD [facility] [place]        Today's Diagnoses     IUD check up    -  1       Follow-ups after your visit        Follow-up notes from your care team     Return in about 1 year (around 2019).      Who to contact     If you have questions or need follow up information about today's clinic visit or your schedule please contact [facility] [place] directly at [phone].  Normal or non-critical lab and imaging results will be communicated to you by MyChart, letter or phone within 4 business days after the clinic has received the results. If you do not hear from us within 7 days, please contact the clinic through MyChart or phone. If you have a critical or abnormal lab result, we will notify you by phone as soon as possible.  Submit refill requests through flaregames or call your pharmacy and they will forward the refill request to us. Please allow 3 business days for your refill to be completed.          Additional Information About Your Visit        MyChart Information     [email or web address] lets you send messages to your doctor, view your test results, renew your prescriptions, schedule appointments and more. To sign up, go to [email or web address] . Click on \"Log in\" on the left side of the screen, which will take you to the Welcome page. Then click on \"Sign up Now\" on the right side of the page.     You will be asked to enter the access code listed below, as well as some personal information. Please follow the directions to create your username and password.     Your access code is: [email or web address]  Expires: 2018  8:09 AM     Your access code will  in 90 days. If you need help or a new code, please call your [place] clinic or " "[phone].        Care EveryWhere ID     This is your Care EveryWhere ID. This could be used by other organizations to access your [place] medical records  [id number]        Your Vitals Were     Pulse Height Last Period Breastfeeding? BMI (Body Mass Index)       72 5' 6\" (1.676 m) 01/01/2018 No 23.57 kg/m2        Blood Pressure from Last 3 Encounters:   01/08/18 118/66   10/24/17 122/80   10/12/17 121/80    Weight from Last 3 Encounters:   01/08/18 146 lb (66.2 kg)   10/24/17 143 lb (64.9 kg)   10/12/17 141 lb (64 kg)              Today, you had the following     No orders found for display       Primary Care Provider Office Phone # Fax #    Abby MONTEZ Vinson [phone] [address] [facility] [address]  [place]                [place] [address]        Equal Access to Services     [facility] : Hadii aad ku hadasho Mouna, waaxda luqadaha, qaybta kaalmada adekellyyada, deepthi delgadoin kinsey ortiz . So [facility] [phone].    ATENCIÓN: Si gabela ailyn, tiene a burdick disposición servicios gratuitos de asistencia lingüística. Llame al [phone].    We comply with applicable federal civil rights laws and [place] laws. We do not discriminate on the basis of race, color, national origin, age, disability, sex, sexual orientation, or gender identity.            Thank you!     Thank you for choosing [facility] [place]  for your care. Our goal is always to provide you with excellent care. Hearing back from our patients is one way we can continue to improve our services. Please take a few minutes to complete the written survey that you may receive in the mail after your visit with us. Thank you!             Your Updated Medication List - Protect others around you: Learn how to safely use, store and throw away your medicines at www.disposemymeds.org.          This list is accurate as of: 1/8/18 11:21 AM.  Always use your most recent med list.                   Brand Name Dispense Instructions " for use Diagnosis    buPROPion 300 MG 24 hr tablet    WELLBUTRIN XL    90 tablet    Take 1 tablet (300 mg) by mouth every morning    Seasonal affective disorder (H), Depression with anxiety       FLUoxetine 40 MG capsule    PROzac    90 capsule    Take 1 capsule (40 mg) by mouth daily    Seasonal affective disorder (H), Depression with anxiety       paragard intrauterine copper      1 each by Intrauterine route once

## 2018-06-19 ENCOUNTER — OFFICE VISIT (OUTPATIENT)
Dept: OBGYN | Facility: CLINIC | Age: 28
End: 2018-06-19
Payer: COMMERCIAL

## 2018-06-19 VITALS — BODY MASS INDEX: 22.44 KG/M2 | WEIGHT: 139 LBS | SYSTOLIC BLOOD PRESSURE: 104 MMHG | DIASTOLIC BLOOD PRESSURE: 60 MMHG

## 2018-06-19 DIAGNOSIS — Z30.433 ENCOUNTER FOR IUD REMOVAL AND REINSERTION: Primary | ICD-10-CM

## 2018-06-19 PROCEDURE — 58301 REMOVE INTRAUTERINE DEVICE: CPT | Performed by: OBSTETRICS & GYNECOLOGY

## 2018-06-19 PROCEDURE — 58300 INSERT INTRAUTERINE DEVICE: CPT | Performed by: OBSTETRICS & GYNECOLOGY

## 2018-06-19 NOTE — PROGRESS NOTES
INDICATIONS:                                                      Is a pregnancy test required: No.  Was a consent obtained?  Yes    Cristy Gibbons is a 27 year old female,, No LMP recorded. Patient is not currently having periods (Reason: IUD). who presents today for IUD removal and insertion of a new IUD. Her current IUD was placed 10/2017 . She has had problems including excessive bleeding with the IUD. She requests removal of the IUD because she wants to change her method of contraception    The risks, benefits and alternatives of IUD insertion were discussed in detail previously. She also has reviewed the product brochure.  She has elected to go ahead with the removal and insertion of the new IUD today and her questions were answered. Consent was signed. Her LMP began 3 weeks ago and was normal in duration and amount of flow. A pregnancy test was performed today:  No    Today's PHQ-2 Score:   PHQ-2 (  Pf[employer]) 10/12/2017   Q1: Little interest or pleasure in doing things 0   Q2: Feeling down, depressed or hopeless 0   PHQ-2 Score 0       PROCEDURE:                                                      A speculum exam was performed and the cervix was visualized. The IUD string was visualized. Using ring forceps, the string  was grasped and the IUD removed intact.    The pelvic exam revealed normal external genitalia. On bimanual exam the uterus was Anteverted and normal in size with no tenderness present. The cervix was prepped with Betadine . The anterior lip of the cervix was grasped with a single toothed tenaculum. The uterus sounded to 7 cm. A Mirena IUD was then inserted without difficulty. The string was cut to 3 cm.  The patient experienced a mild amount of cramping.      POST PROCEDURE:                                                      The patient tolerated the procedure well. Patient was discharged in stable condition.    Call if bleeding, pain or fever occur. and Birth control counseling  given, needs 7 days of alternative contraception with Mirena IUD.    Vale Brown MD

## 2018-06-19 NOTE — MR AVS SNAPSHOT
After Visit Summary   6/19/2018    Cristy Gibbons    MRN: [id number]           Patient Information     Date Of Birth          1990        Visit Information        Provider Department      6/19/2018 2:10 PM Vale Brown MD [facility] [place]        Today's Diagnoses     Encounter for IUD removal and reinsertion    -  1       Follow-ups after your visit        Follow-up notes from your care team     Return in about 6 weeks (around 7/31/2018).      Who to contact     If you have questions or need follow up information about today's clinic visit or your schedule please contact [facility] directly at [phone].  Normal or non-critical lab and imaging results will be communicated to you by MyChart, letter or phone within 4 business days after the clinic has received the results. If you do not hear from us within 7 days, please contact the clinic through MyChart or phone. If you have a critical or abnormal lab result, we will notify you by phone as soon as possible.  Submit refill requests through [employer] or call your pharmacy and they will forward the refill request to us. Please allow 3 business days for your refill to be completed.          Additional Information About Your Visit        Care EveryWhere ID     This is your Care EveryWhere ID. This could be used by other organizations to access your [place] medical records  [id number]        Your Vitals Were     Breastfeeding? BMI (Body Mass Index)                No 22.44 kg/m2           Blood Pressure from Last 3 Encounters:   06/19/18 104/60   01/08/18 118/66   10/24/17 122/80    Weight from Last 3 Encounters:   06/19/18 139 lb (63 kg)   01/08/18 146 lb (66.2 kg)   10/24/17 143 lb (64.9 kg)              We Performed the Following     HC LEVONORGESTREL IU 52MG 5 YR     INSERTION INTRAUTERINE DEVICE     REMOVE INTRAUTERINE DEVICE          Today's Medication Changes          These changes are  accurate as of 6/19/18  2:16 PM.  If you have any questions, ask your nurse or doctor.               Start taking these medicines.        Dose/Directions    levonorgestrel 20 MCG/24HR IUD   Commonly known as:  MIRENA (52 MG)   Used for:  Encounter for IUD removal and reinsertion   Started by:  Vale Brown MD        Dose:  1 each   1 each (20 mcg) by Intrauterine route once for 1 dose   Quantity:  1 each   Refills:  0         Stop taking these medicines if you haven't already. Please contact your care team if you have questions.     paragard intrauterine copper   Stopped by:  Vale Brown MD                Where to get your medicines      Some of these will need a paper prescription and others can be bought over the counter.  Ask your nurse if you have questions.     You don't need a prescription for these medications     levonorgestrel 20 MCG/24HR IUD                Primary Care Provider Office Phone # Fax #    Abby Vinson [phone] [address] [address]        Equal Access to Services     [facility]: Hadii aad ku hadashbrijesh Freire, waaxda luqadaha, qaybta kaalmada joseph, deepthi ortiz . So [facility] [phone].    ATENCIÓN: Si habla español, tiene a burdick disposición servicios gratuitos de asistencia lingüística. Ellie al [phone].    We comply with applicable federal civil rights laws and [place] laws. We do not discriminate on the basis of race, color, national origin, age, disability, sex, sexual orientation, or gender identity.            Thank you!     Thank you for choosing [facility]  for your care. Our goal is always to provide you with excellent care. Hearing back from our patients is one way we can continue to improve our services. Please take a few minutes to complete the written survey that you may receive in the mail after your visit with us. Thank  you!             Your Updated Medication List - Protect others around you: Learn how to safely use, store and throw away your medicines at www.disposemymeds.org.          This list is accurate as of 6/19/18  2:16 PM.  Always use your most recent med list.                   Brand Name Dispense Instructions for use Diagnosis    buPROPion 300 MG 24 hr tablet    WELLBUTRIN XL    90 tablet    Take 1 tablet (300 mg) by mouth every morning    Seasonal affective disorder (H), Depression with anxiety       FLUoxetine 40 MG capsule    PROzac    90 capsule    Take 1 capsule (40 mg) by mouth daily    Seasonal affective disorder (H), Depression with anxiety       levonorgestrel 20 MCG/24HR IUD    MIRENA (52 MG)    1 each    1 each (20 mcg) by Intrauterine route once for 1 dose    Encounter for IUD removal and reinsertion

## 2018-07-10 DIAGNOSIS — F33.8 SEASONAL AFFECTIVE DISORDER (H): ICD-10-CM

## 2018-07-10 DIAGNOSIS — F41.8 DEPRESSION WITH ANXIETY: ICD-10-CM

## 2018-07-10 NOTE — TELEPHONE ENCOUNTER
"FLUoxetine (PROZAC) 40 MG capsule 90 capsule 1 10/11/2017     Last Written Prescription Date:  10/11/17  Last Fill Quantity: 90,  # refills: 1   Last office visit: 10/11/2017 with prescribing provider:  Alisson   Future Office Visit:  none  Requested Prescriptions   Pending Prescriptions Disp Refills     FLUoxetine (PROZAC) 40 MG capsule [Pharmacy Med Name: FLUOXETINE HCL 40MG CAPS] 90 capsule 1     Sig: TAKE ONE CAPSULE BY MOUTH EVERY DAY    SSRIs Protocol Failed    7/10/2018  1:37 PM       Failed - PHQ-9 score less than 5 in past 6 months    Please review last PHQ-9 score.          Failed - Recent (6 mo) or future (30 days) visit within the authorizing provider's specialty    Patient had office visit in the last 6 months or has a visit in the next 30 days with authorizing provider or within the authorizing provider's specialty.  See \"Patient Info\" tab in inbasket, or \"Choose Columns\" in Meds & Orders section of the refill encounter.           Passed - Patient is age 18 or older       Passed - No active pregnancy on record       Passed - No positive pregnancy test in last 12 months        PHQ-9 SCORE 6/10/2016 10/11/2017 10/12/2017   Total Score - - -   Total Score 0 0 0     "

## 2018-07-11 RX ORDER — FLUOXETINE 40 MG/1
CAPSULE ORAL
Qty: 30 CAPSULE | Refills: 0 | Status: SHIPPED | OUTPATIENT
Start: 2018-07-11 | End: 2018-08-24

## 2018-08-21 ENCOUNTER — OFFICE VISIT (OUTPATIENT)
Dept: OBGYN | Facility: CLINIC | Age: 28
End: 2018-08-21
Payer: COMMERCIAL

## 2018-08-21 VITALS
DIASTOLIC BLOOD PRESSURE: 66 MMHG | BODY MASS INDEX: 22.5 KG/M2 | SYSTOLIC BLOOD PRESSURE: 118 MMHG | HEART RATE: 64 BPM | WEIGHT: 140 LBS | HEIGHT: 66 IN

## 2018-08-21 DIAGNOSIS — Z30.431 IUD CHECK UP: Primary | ICD-10-CM

## 2018-08-21 PROCEDURE — 99213 OFFICE O/P EST LOW 20 MIN: CPT | Performed by: OBSTETRICS & GYNECOLOGY

## 2018-08-21 NOTE — MR AVS SNAPSHOT
"              After Visit Summary   8/21/2018    Cristy Gibbons    MRN: [id number]           Patient Information     Date Of Birth          1990        Visit Information        Provider Department      8/21/2018 10:10 AM Vale Brown MD [facility]        Today's Diagnoses     IUD check up    -  1       Follow-ups after your visit        Who to contact     If you have questions or need follow up information about today's clinic visit or your schedule please contact [facility] directly at [phone].  Normal or non-critical lab and imaging results will be communicated to you by MyChart, letter or phone within 4 business days after the clinic has received the results. If you do not hear from us within 7 days, please contact the clinic through MyChart or phone. If you have a critical or abnormal lab result, we will notify you by phone as soon as possible.  Submit refill requests through [employer] or call your pharmacy and they will forward the refill request to us. Please allow 3 business days for your refill to be completed.          Additional Information About Your Visit        Care EveryWhere ID     This is your Care EveryWhere ID. This could be used by other organizations to access your [place] medical records  [id number]        Your Vitals Were     Pulse Height Breastfeeding? BMI (Body Mass Index)          64 5' 6\" (1.676 m) No 22.6 kg/m2         Blood Pressure from Last 3 Encounters:   08/21/18 118/66   06/19/18 104/60   01/08/18 118/66    Weight from Last 3 Encounters:   08/21/18 140 lb (63.5 kg)   06/19/18 139 lb (63 kg)   01/08/18 146 lb (66.2 kg)              Today, you had the following     No orders found for display       Primary Care Provider Office Phone # Fax #    Abby Vinson [phone] [address]  [place]                [place] [address]        Equal Access to Services     [facility]: Hadii aad ku " roxanne Freire, robb montenegro, kim cristadameon julienneanna, deepthi sandyin hayaaalia robinskelly dmitriymanadeedee clemensMyriamdipak yadira. So [facility] [phone].    ATENCIÓN: Si habla español, tiene a burdick disposición servicios gratuitos de asistencia lingüística. Ellie al [phone].    We comply with applicable federal civil rights laws and [place] laws. We do not discriminate on the basis of race, color, national origin, age, disability, sex, sexual orientation, or gender identity.            Thank you!     Thank you for choosing [facility]  for your care. Our goal is always to provide you with excellent care. Hearing back from our patients is one way we can continue to improve our services. Please take a few minutes to complete the written survey that you may receive in the mail after your visit with us. Thank you!             Your Updated Medication List - Protect others around you: Learn how to safely use, store and throw away your medicines at www.disposemymeds.org.          This list is accurate as of 8/21/18 10:35 AM.  Always use your most recent med list.                   Brand Name Dispense Instructions for use Diagnosis    buPROPion 300 MG 24 hr tablet    WELLBUTRIN XL    90 tablet    Take 1 tablet (300 mg) by mouth every morning    Seasonal affective disorder (H), Depression with anxiety       FLUoxetine 40 MG capsule    PROzac    30 capsule    TAKE ONE CAPSULE BY MOUTH EVERY DAY    Seasonal affective disorder (H), Depression with anxiety       levonorgestrel 20 MCG/24HR IUD    MIRENA (52 MG)    1 each    1 each (20 mcg) by Intrauterine route once for 1 dose    Encounter for IUD removal and reinsertion

## 2018-08-21 NOTE — PROGRESS NOTES
SUBJECTIVE:                                                   Cristy Gibbons is a 27 year old female who presents to clinic today for the following health issue(s):  Patient presents with:  IUD: Here for IUD follow up      Additional information: no concerns, light spotting    HPI:  Doing much better on this form of contraception. She has only intermittent spotting. She is happier with it versus the paragard.    No LMP recorded. Patient is not currently having periods (Reason: IUD)..   Patient is sexually active, .  Using IUD for contraception.    reports that she has never smoked. She has never used smokeless tobacco.    STD testing offered?  Declined    Health maintenance updated:  yes    Today's PHQ-2 Score:   PHQ-2 (  [employer]) 10/12/2017   Q1: Little interest or pleasure in doing things 0   Q2: Feeling down, depressed or hopeless 0   PHQ-2 Score 0     Today's PHQ-9 Score:   PHQ-9 SCORE 10/12/2017   Total Score -   Total Score 0     Today's GURPREET-7 Score:   GURPREET-7 SCORE 10/12/2017   Total Score 0       Problem list and histories reviewed & adjusted, as indicated.  Additional history: as documented.    Patient Active Problem List   Diagnosis     Seasonal affective disorder (H)     CARDIOVASCULAR SCREENING; LDL GOAL LESS THAN 160     Depression with anxiety     Acute pain of right shoulder     Family history of breast cancer     Presence of intrauterine contraceptive device     Past Surgical History:   Procedure Laterality Date     APPENDECTOMY        Social History   Substance Use Topics     Smoking status: Never Smoker     Smokeless tobacco: Never Used     Alcohol use 0.0 oz/week     0 Standard drinks or equivalent per week      Comment: 1 x per month       Problem (# of Occurrences) Relation (Name,Age of Onset)    Breast Cancer (2) Maternal Grandmother, Paternal Grandmother    Cancer (2) Maternal Grandmother, Paternal Grandmother    Hypertension (1) Mother       Negative family history of:  "Diabetes, Thyroid Disease, Glaucoma, Macular Degeneration            Current Outpatient Prescriptions   Medication Sig     buPROPion (WELLBUTRIN XL) 300 MG 24 hr tablet Take 1 tablet (300 mg) by mouth every morning     FLUoxetine (PROZAC) 40 MG capsule TAKE ONE CAPSULE BY MOUTH EVERY DAY     levonorgestrel (MIRENA, 52 MG,) 20 MCG/24HR IUD 1 each (20 mcg) by Intrauterine route once for 1 dose     No current facility-administered medications for this visit.      Allergies   Allergen Reactions     No Known Allergies        ROS:  12 point review of systems negative other than symptoms noted below.    OBJECTIVE:     /66  Pulse 64  Ht 5' 6\" (1.676 m)  Wt 140 lb (63.5 kg)  Breastfeeding? No  BMI 22.6 kg/m2  Body mass index is 22.6 kg/(m^2).    Exam:  Constitutional:  Appearance: Well nourished, well developed alert, in no acute distress  Neurologic/Psychiatric:  Mental Status:  Oriented X3    External Genitalia: normal  Vagina: normal no pooled blood  Cervix: normal without lesions. IUD strings seen and about 3 cm in length.    In-Clinic Test Results:  No results found for this or any previous visit (from the past 24 hour(s)).    ASSESSMENT/PLAN:                                                        ICD-10-CM    1. IUD check up Z30.431      Doing well with the Mirena IUD. Ok to continue on this method.    Vale Brown MD  [facility]  "

## 2018-08-24 DIAGNOSIS — F33.8 SEASONAL AFFECTIVE DISORDER (H): ICD-10-CM

## 2018-08-24 DIAGNOSIS — F41.8 DEPRESSION WITH ANXIETY: ICD-10-CM

## 2018-08-24 NOTE — LETTER
[facility]  [address] [facility] [address]  Phone: [phone]        August 28, 2018      Cristy Gibbons                                                                                                                                [address]   [place] [address]            Dear Ms. Gibbons,    We are concerned about your health care.  We recently provided you with a medication refill.  Many medications require routine follow-up with your Doctor.      At this time we ask that: You schedule an appointment for your annual physical or at least a medication check.    Your prescription: Has been refilled for 1 month so you may have time for the above noted follow-up.      Thank you,      Abby Vinson MD/ RONAN

## 2018-08-24 NOTE — TELEPHONE ENCOUNTER
"Pending Prescriptions:                       Disp   Refills    FLUoxetine (PROZAC) 40 MG capsule [Pharma*30 cap*0            Sig: TAKE ONE CAPSULE BY MOUTH EVERY DAY      Last Written Prescription Date:  07/11/2018  Last Fill Quantity:30 ,  # refills: 0   Last office visit: 10/11/2017 with prescribing provider:     Future Office Visit:    Requested Prescriptions   Pending Prescriptions Disp Refills     FLUoxetine (PROZAC) 40 MG capsule [Pharmacy Med Name: FLUOXETINE HCL 40MG CAPS] 30 capsule 0     Sig: TAKE ONE CAPSULE BY MOUTH EVERY DAY    SSRIs Protocol Failed    8/24/2018  8:45 AM       Failed - PHQ-9 score less than 5 in past 6 months    Please review last PHQ-9 score.          Failed - Recent (6 mo) or future (30 days) visit within the authorizing provider's specialty    Patient had office visit in the last 6 months or has a visit in the next 30 days with authorizing provider or within the authorizing provider's specialty.  See \"Patient Info\" tab in inbasket, or \"Choose Columns\" in Meds & Orders section of the refill encounter.           Passed - Patient is age 18 or older       Passed - No active pregnancy on record       Passed - No positive pregnancy test in last 12 months          "

## 2018-08-27 RX ORDER — FLUOXETINE 40 MG/1
40 CAPSULE ORAL DAILY
Qty: 30 CAPSULE | Refills: 0 | Status: SHIPPED | OUTPATIENT
Start: 2018-08-27 | End: 2018-09-26

## 2018-08-27 NOTE — TELEPHONE ENCOUNTER
Routing to [facility] to contact patient to inform due for appointment.    Routing refill request to provider for review/approval because:  María given x1 and patient did not follow up, please advise    Shanel CAPELLAN RN

## 2018-09-26 ENCOUNTER — OFFICE VISIT (OUTPATIENT)
Dept: FAMILY MEDICINE | Facility: CLINIC | Age: 28
End: 2018-09-26
Payer: COMMERCIAL

## 2018-09-26 VITALS
TEMPERATURE: 97.9 F | HEART RATE: 86 BPM | WEIGHT: 140 LBS | BODY MASS INDEX: 22.5 KG/M2 | HEIGHT: 66 IN | DIASTOLIC BLOOD PRESSURE: 79 MMHG | OXYGEN SATURATION: 96 % | SYSTOLIC BLOOD PRESSURE: 121 MMHG

## 2018-09-26 DIAGNOSIS — Z11.4 SCREENING FOR HIV (HUMAN IMMUNODEFICIENCY VIRUS): ICD-10-CM

## 2018-09-26 DIAGNOSIS — Z13.220 SCREENING FOR LIPID DISORDERS: ICD-10-CM

## 2018-09-26 DIAGNOSIS — Z13.29 SCREENING FOR THYROID DISORDER: ICD-10-CM

## 2018-09-26 DIAGNOSIS — Z79.899 MEDICATION MANAGEMENT: ICD-10-CM

## 2018-09-26 DIAGNOSIS — F33.8 SEASONAL AFFECTIVE DISORDER (H): ICD-10-CM

## 2018-09-26 DIAGNOSIS — Z97.5 IUD (INTRAUTERINE DEVICE) IN PLACE: ICD-10-CM

## 2018-09-26 DIAGNOSIS — F41.8 DEPRESSION WITH ANXIETY: ICD-10-CM

## 2018-09-26 DIAGNOSIS — Z13.1 SCREENING FOR DIABETES MELLITUS: ICD-10-CM

## 2018-09-26 DIAGNOSIS — Z00.00 ROUTINE HISTORY AND PHYSICAL EXAMINATION OF ADULT: Primary | ICD-10-CM

## 2018-09-26 LAB
ALBUMIN SERPL-MCNC: 4.2 G/DL (ref 3.4–5)
ALP SERPL-CCNC: 58 U/L (ref 40–150)
ALT SERPL W P-5'-P-CCNC: 20 U/L (ref 0–50)
ANION GAP SERPL CALCULATED.3IONS-SCNC: 9 MMOL/L (ref 3–14)
AST SERPL W P-5'-P-CCNC: 15 U/L (ref 0–45)
BILIRUB SERPL-MCNC: 0.9 MG/DL (ref 0.2–1.3)
BUN SERPL-MCNC: 9 MG/DL (ref 7–30)
CALCIUM SERPL-MCNC: 9.5 MG/DL (ref 8.5–10.1)
CHLORIDE SERPL-SCNC: 104 MMOL/L (ref 94–109)
CHOLEST SERPL-MCNC: 110 MG/DL
CO2 SERPL-SCNC: 26 MMOL/L (ref 20–32)
CREAT SERPL-MCNC: 0.8 MG/DL (ref 0.52–1.04)
GFR SERPL CREATININE-BSD FRML MDRD: 84 ML/MIN/1.7M2
GLUCOSE SERPL-MCNC: 82 MG/DL (ref 70–99)
HDLC SERPL-MCNC: 58 MG/DL
HIV 1+2 AB+HIV1 P24 AG SERPL QL IA: NONREACTIVE
LDLC SERPL CALC-MCNC: 43 MG/DL
NONHDLC SERPL-MCNC: 52 MG/DL
POTASSIUM SERPL-SCNC: 3.9 MMOL/L (ref 3.4–5.3)
PROT SERPL-MCNC: 7.7 G/DL (ref 6.8–8.8)
SODIUM SERPL-SCNC: 139 MMOL/L (ref 133–144)
TRIGL SERPL-MCNC: 44 MG/DL
TSH SERPL DL<=0.005 MIU/L-ACNC: 1.05 MU/L (ref 0.4–4)

## 2018-09-26 PROCEDURE — 99213 OFFICE O/P EST LOW 20 MIN: CPT | Mod: 25 | Performed by: INTERNAL MEDICINE

## 2018-09-26 PROCEDURE — 80053 COMPREHEN METABOLIC PANEL: CPT | Performed by: INTERNAL MEDICINE

## 2018-09-26 PROCEDURE — 36415 COLL VENOUS BLD VENIPUNCTURE: CPT | Performed by: INTERNAL MEDICINE

## 2018-09-26 PROCEDURE — 80061 LIPID PANEL: CPT | Performed by: INTERNAL MEDICINE

## 2018-09-26 PROCEDURE — 99395 PREV VISIT EST AGE 18-39: CPT | Performed by: INTERNAL MEDICINE

## 2018-09-26 PROCEDURE — 84443 ASSAY THYROID STIM HORMONE: CPT | Performed by: INTERNAL MEDICINE

## 2018-09-26 PROCEDURE — 87389 HIV-1 AG W/HIV-1&-2 AB AG IA: CPT | Performed by: INTERNAL MEDICINE

## 2018-09-26 RX ORDER — FLUOXETINE 40 MG/1
40 CAPSULE ORAL DAILY
Qty: 90 CAPSULE | Refills: 2 | Status: SHIPPED | OUTPATIENT
Start: 2018-09-26 | End: 2020-03-09

## 2018-09-26 RX ORDER — BUPROPION HYDROCHLORIDE 300 MG/1
300 TABLET ORAL EVERY MORNING
Qty: 90 TABLET | Refills: 3 | Status: SHIPPED | OUTPATIENT
Start: 2018-09-26 | End: 2020-03-09

## 2018-09-26 RX ORDER — FLUOXETINE 40 MG/1
40 CAPSULE ORAL DAILY
Qty: 90 CAPSULE | Refills: 1 | Status: SHIPPED | OUTPATIENT
Start: 2018-09-26 | End: 2018-09-26

## 2018-09-26 NOTE — MR AVS SNAPSHOT
After Visit Summary   9/26/2018    Cristy Gibbons    MRN: [id number]           Patient Information     Date Of Birth          1990        Visit Information        Provider Department      9/26/2018 11:00 AM Abby Vinson MD [facility]        Today's Diagnoses     Routine history and physical examination of adult    -  1    IUD (intrauterine device) in place        Screening for thyroid disorder        Screening for lipid disorders        Screening for HIV (human immunodeficiency virus)        Screening for diabetes mellitus        Medication management        Depression with anxiety        Seasonal affective disorder (H)          Care Instructions      Preventive Health Recommendations  Female Ages 26 - 39  Yearly exam:   See your health care provider every year in order to    Review health changes.     Discuss preventive care.      Review your medicines if you your doctor has prescribed any.    Until age 30: Get a Pap test every three years (more often if you have had an abnormal result).    After age 30: Talk to your doctor about whether you should have a Pap test every 3 years or have a Pap test with HPV screening every 5 years.   You do not need a Pap test if your uterus was removed (hysterectomy) and you have not had cancer.  You should be tested each year for STDs (sexually transmitted diseases), if you're at risk.   Talk to your provider about how often to have your cholesterol checked.  If you are at risk for diabetes, you should have a diabetes test (fasting glucose).  Shots: Get a flu shot each year. Get a tetanus shot every 10 years.   Nutrition:     Eat at least 5 servings of fruits and vegetables each day.    Eat whole-grain bread, whole-wheat pasta and brown rice instead of white grains and rice.    Get adequate Calcium and Vitamin D.     Lifestyle    Exercise at least 150 minutes a week (30 minutes a day, 5 days of the week). This will help you control your weight  "and prevent disease.    Limit alcohol to one drink per day.    No smoking.     Wear sunscreen to prevent skin cancer.    See your dentist every six months for an exam and cleaning.      Labs today  Follow up in one year for physical   Seek sooner medical attention if there is any worsening of symptoms or problems.            Follow-ups after your visit        Follow-up notes from your care team     Return in about 1 year (around 9/26/2019) for Physical Exam.      Who to contact     If you have questions or need follow up information about today's clinic visit or your schedule please contact [facility] directly at [phone].  Normal or non-critical lab and imaging results will be communicated to you by MyChart, letter or phone within 4 business days after the clinic has received the results. If you do not hear from us within 7 days, please contact the clinic through MyChart or phone. If you have a critical or abnormal lab result, we will notify you by phone as soon as possible.  Submit refill requests through [employer] or call your pharmacy and they will forward the refill request to us. Please allow 3 business days for your refill to be completed.          Additional Information About Your Visit        Care EveryWhere ID     This is your Care EveryWhere ID. This could be used by other organizations to access your [place] medical records  [id number]        Your Vitals Were     Pulse Temperature Height Pulse Oximetry Breastfeeding? BMI (Body Mass Index)    86 97.9  F (36.6  C) (Oral) 5' 6\" (1.676 m) 96% No 22.6 kg/m2       Blood Pressure from Last 3 Encounters:   09/26/18 121/79   08/21/18 118/66   06/19/18 104/60    Weight from Last 3 Encounters:   09/26/18 140 lb (63.5 kg)   08/21/18 140 lb (63.5 kg)   06/19/18 139 lb (63 kg)              We Performed the Following     Comprehensive metabolic panel (BMP + Alb, Alk Phos, ALT, AST, Total. Bili, TP)     DEPRESSION ACTION PLAN (DAP)     HIV Antigen " Antibody Combo     Lipid panel reflex to direct LDL Non-fasting     TSH with free T4 reflex          Today's Medication Changes          These changes are accurate as of 9/26/18 11:23 AM.  If you have any questions, ask your nurse or doctor.               These medicines have changed or have updated prescriptions.        Dose/Directions    FLUoxetine 40 MG capsule   Commonly known as:  PROzac   This may have changed:  additional instructions   Used for:  Seasonal affective disorder (H), Depression with anxiety   Changed by:  Abby Vinson MD        Dose:  40 mg   Take 1 capsule (40 mg) by mouth daily   Quantity:  90 capsule   Refills:  1            Where to get your medicines      These medications were sent to [facility], [place] - [address], [address]  [address], [address], [facility] [address]     Phone:  [phone]     buPROPion 300 MG 24 hr tablet    FLUoxetine 40 MG capsule                Primary Care Provider Office Phone # Fax #    Abby Vinson [phone] [address]  [place] [facility] [address]        Equal Access to Services     [facility]: Hadii aad ku hadasho Mouna, waaxda luqadaha, qaybta kaalmada adekellyyaisrrael, waxnehemiah ortiz . So [facility] [phone].    ATENCIÓN: Si habla español, tiene a burdick disposición servicios gratuitos de asistencia lingüística. Llame al [phone].    We comply with applicable federal civil rights laws and [place] laws. We do not discriminate on the basis of race, color, national origin, age, disability, sex, sexual orientation, or gender identity.            Thank you!     Thank you for choosing [facility]  for your care. Our goal is always to provide you with excellent care. Hearing back from our patients is one way we can continue to improve our services. Please take a few minutes to complete the written survey that you may receive in the mail after  your visit with us. Thank you!             Your Updated Medication List - Protect others around you: Learn how to safely use, store and throw away your medicines at www.disposemymeds.org.          This list is accurate as of 9/26/18 11:23 AM.  Always use your most recent med list.                   Brand Name Dispense Instructions for use Diagnosis    buPROPion 300 MG 24 hr tablet    WELLBUTRIN XL    90 tablet    Take 1 tablet (300 mg) by mouth every morning    Seasonal affective disorder (H), Depression with anxiety       FLUoxetine 40 MG capsule    PROzac    90 capsule    Take 1 capsule (40 mg) by mouth daily    Seasonal affective disorder (H), Depression with anxiety       levonorgestrel 20 MCG/24HR IUD    MIRENA (52 MG)    1 each    1 each (20 mcg) by Intrauterine route once for 1 dose    Encounter for IUD removal and reinsertion

## 2018-09-26 NOTE — PROGRESS NOTES
SUBJECTIVE:   CC: Cristy Gibbons is an 27 year old woman who presents for preventive health visit.     Healthy Habits:    Do you get at least three servings of calcium containing foods daily (dairy, green leafy vegetables, etc.)? No, 1-3 taking supplements.    Amount of exercise or daily activities, outside of work: 3 day(s) per week 30-45mins    Problems taking medications regularly No    Medication side effects: No    Have you had an eye exam in the past two years? no    Do you see a dentist twice per year? yes    Do you have sleep apnea, excessive snoring or daytime drowsiness?no      Today's PHQ-2 Score:   PHQ-2 ( 1999 Pfizer) 10/12/2017 10/11/2017   Q1: Little interest or pleasure in doing things 0 0   Q2: Feeling down, depressed or hopeless 0 0   PHQ-2 Score 0 0     Abuse: Current or Past(Physical, Sexual or Emotional)- NO  Do you feel safe in your environment - YES    Social History   Substance Use Topics     Smoking status: Never Smoker     Smokeless tobacco: Never Used     Alcohol use 0.0 oz/week     0 Standard drinks or equivalent per week      Comment: 1 x per month      If you drink alcohol do you typically have >3 drinks per day or >7 drinks per week? No                     Reviewed orders with patient.  Reviewed health maintenance and updated orders accordingly - Yes  Labs reviewed in EPIC        Pertinent mammograms are reviewed under the imaging tab.  History of abnormal Pap smear: NO - age 21-29 PAP every 3 years recommended  PAP / HPV 10/24/2017   PAP NIL     Reviewed and updated as needed this visit by clinical staff  Tobacco  Allergies  Meds  Soc Hx        Reviewed and updated as needed this visit by Provider            ROS:  CONSTITUTIONAL: NEGATIVE for fever, chills, change in weight  INTEGUMENTARU/SKIN: NEGATIVE for worrisome rashes, moles or lesions  EYES: NEGATIVE for vision changes or irritation  ENT: NEGATIVE for ear, mouth and throat problems  RESP: NEGATIVE for significant cough  "or SOB  BREAST: NEGATIVE for masses, tenderness or discharge  CV: NEGATIVE for chest pain, palpitations or peripheral edema  GI: NEGATIVE for nausea, abdominal pain, heartburn, or change in bowel habits  : NEGATIVE for unusual urinary or vaginal symptoms. she has Mirena IUD  Still she gets her periods but they are not heavier like they were when she had a copper IUD  MUSCULOSKELETAL: NEGATIVE for significant arthralgias or myalgia  NEURO: NEGATIVE for weakness, dizziness or paresthesias  PSYCHIATRIC: NEGATIVE for changes in mood or affect  Patient works as a registered nurse  She has been taking Prozac and Wellbutrin for long time  This is working well for her  Not interested in changing the dose    OBJECTIVE:   /79 (BP Location: Right arm, Patient Position: Sitting, Cuff Size: Adult Regular)  Pulse 86  Temp 97.9  F (36.6  C) (Oral)  Ht 5' 6\" (1.676 m)  Wt 140 lb (63.5 kg)  SpO2 96%  Breastfeeding? No  BMI 22.6 kg/m2  EXAM:  GENERAL: healthy, alert and no distress  EYES: Eyes grossly normal to inspection, PERRL and conjunctivae and sclerae normal  HENT: ear canals and TM's normal, nose and mouth without ulcers or lesions  NECK: no adenopathy, no asymmetry, masses, or scars and thyroid normal to palpation  RESP: lungs clear to auscultation - no rales, rhonchi or wheezes  BREAST: normal without masses, tenderness or nipple discharge and no palpable axillary masses or adenopathy  CV: regular rate and rhythm, normal S1 S2, no S3 or S4, no murmur, click or rub, no peripheral edema and peripheral pulses strong  ABDOMEN: soft, nontender, no hepatosplenomegaly, no masses and bowel sounds normal  MS: no gross musculoskeletal defects noted, no edema  SKIN: no suspicious lesions or rashes  NEURO: Normal strength and tone, mentation intact and speech normal  PSYCH: mentation appears normal, affect normal/bright        ASSESSMENT/PLAN:   Cristy was seen today for physical.    Diagnoses and all orders for this " visit:    Routine history and physical examination of adult  Preventive health counseling was done  Last Pap smear was in October 2017    IUD (intrauterine device) in place  Comments:  MIrena  Followed by gynecologist    Screening for thyroid disorder  -     TSH with free T4 reflex    Screening for lipid disorders  -     Lipid panel reflex to direct LDL Non-fasting    Screening for HIV (human immunodeficiency virus)  -     HIV Antigen Antibody Combo    Screening for diabetes mellitus  -     Cancel: Glucose    Medication management  -     Comprehensive metabolic panel (BMP + Alb, Alk Phos, ALT, AST, Total. Bili, TP)    Depression with anxiety  -     buPROPion (WELLBUTRIN XL) 300 MG 24 hr tablet; Take 1 tablet (300 mg) by mouth every morning  -     FLUoxetine (PROZAC) 40 MG capsule; Take 1 capsule (40 mg) by mouth daily  She is doing really well with current medication  I discussed with her about slowly and gradually lowering the dose of her medication  We can lower her Prozac to 30 mg and then 20  We can lower her Wellbutrin from 300-150  At this point she is not ready  She would let me know when she makes that decision  Phone visit would be okay  For the time being continue current treatment  She understands she is on higher dose of the medication  Does not experience any side effects  Working well for her    Seasonal affective disorder (H)  -     buPROPion (WELLBUTRIN XL) 300 MG 24 hr tablet; Take 1 tablet (300 mg) by mouth every morning  -     FLUoxetine (PROZAC) 40 MG capsule; Take 1 capsule (40 mg) by mouth daily  See the note above    Other orders  -     DEPRESSION ACTION PLAN (DAP)    She will get flu shot through her work    COUNSELING:   Reviewed preventive health counseling, as reflected in patient instructions       Regular exercise       Healthy diet/nutrition    BP Readings from Last 1 Encounters:   09/26/18 121/79     Estimated body mass index is 22.6 kg/(m^2) as calculated from the following:    Height  "as of this encounter: 5' 6\" (1.676 m).    Weight as of this encounter: 140 lb (63.5 kg).      Preventive health counseling was also done.       reports that she has never smoked. She has never used smokeless tobacco.      Counseling Resources:  ATP IV Guidelines  Pooled Cohorts Equation Calculator  Breast Cancer Risk Calculator  FRAX Risk Assessment  ICSI Preventive Guidelines  Dietary Guidelines for Americans, 2010  USDA's MyPlate  ASA Prophylaxis  Lung CA Screening    Abby Vinson MD  [facility]  "

## 2018-09-26 NOTE — LETTER
My Depression Action Plan  Name: Cristy Gibbons   Date of Birth 1990  Date: 9/26/2018    My doctor: Abby Vinson   My clinic: [address] [facility] [address] [phone]          GREEN    ZONE   Good Control    What it looks like:     Things are going generally well. You have normal up s and down s. You may even feel depressed from time to time, but bad moods usually last less than a day.   What you need to do:  1. Continue to care for yourself (see self care plan)  2. Check your depression survival kit and update it as needed  3. Follow your physician s recommendations including any medication.  4. Do not stop taking medication unless you consult with your physician first.           YELLOW         ZONE Getting Worse    What it looks like:     Depression is starting to interfere with your life.     It may be hard to get out of bed; you may be starting to isolate yourself from others.    Symptoms of depression are starting to last most all day and this has happened for several days.     You may have suicidal thoughts but they are not constant.   What you need to do:     1. Call your care team, your response to treatment will improve if you keep your care team informed of your progress. Yellow periods are signs an adjustment may need to be made.     2. Continue your self-care, even if you have to fake it!    3. Talk to someone in your support network    4. Open up your depression survival kit           RED    ZONE Medical Alert - Get Help    What it looks like:     Depression is seriously interfering with your life.     You may experience these or other symptoms: You can t get out of bed most days, can t work or engage in other necessary activities, you have trouble taking care of basic hygiene, or basic responsibilities, thoughts of suicide or death that will not go away, self-injurious behavior.     What you need to do:  1. Call your care team and request  a same-day appointment. If they are not available (weekends or after hours) call your local crisis line, emergency room or 911.            Depression Self Care Plan / Survival Kit    Self-Care for Depression  Here s the deal. Your body and mind are really not as separate as most people think.  What you do and think affects how you feel and how you feel influences what you do and think. This means if you do things that people who feel good do, it will help you feel better.  Sometimes this is all it takes.  There is also a place for medication and therapy depending on how severe your depression is, so be sure to consult with your medical provider and/ or Behavioral Health Consultant if your symptoms are worsening or not improving.     In order to better manage my stress, I will:    Exercise  Get some form of exercise, every day. This will help reduce pain and release endorphins, the  feel good  chemicals in your brain. This is almost as good as taking antidepressants!  This is not the same as joining a gym and then never going! (they count on that by the way ) It can be as simple as just going for a walk or doing some gardening, anything that will get you moving.      Hygiene   Maintain good hygiene (Get out of bed in the morning, Make your bed, Brush your teeth, Take a shower, and Get dressed like you were going to work, even if you are unemployed).  If your clothes don't fit try to get ones that do.    Diet  I will strive to eat foods that are good for me, drink plenty of water, and avoid excessive sugar, caffeine, alcohol, and other mood-altering substances.  Some foods that are helpful in depression are: complex carbohydrates, B vitamins, flaxseed, fish or fish oil, fresh fruits and vegetables.    Psychotherapy  I agree to participate in Individual Therapy (if recommended).    Medication  If prescribed medications, I agree to take them.  Missing doses can result in serious side effects.  I understand that drinking  alcohol, or other illicit drug use, may cause potential side effects.  I will not stop my medication abruptly without first discussing it with my provider.    Staying Connected With Others  I will stay in touch with my friends, family members, and my primary care provider/team.    Use your imagination  Be creative.  We all have a creative side; it doesn t matter if it s oil painting, sand castles, or mud pies! This will also kick up the endorphins.    Witness Beauty  (AKA stop and smell the roses) Take a look outside, even in mid-winter. Notice colors, textures. Watch the squirrels and birds.     Service to others  Be of service to others.  There is always someone else in need.  By helping others we can  get out of ourselves  and remember the really important things.  This also provides opportunities for practicing all the other parts of the program.    Humor  Laugh and be silly!  Adjust your TV habits for less news and crime-drama and more comedy.    Control your stress  Try breathing deep, massage therapy, biofeedback, and meditation. Find time to relax each day.     My support system    Clinic Contact:  Phone number:    Contact 1:  Phone number:    Contact 2:  Phone number:    Anabaptism/:  Phone number:    Therapist:  Phone number:    Local crisis center:    Phone number:    Other community support:  Phone number:

## 2018-09-26 NOTE — PATIENT INSTRUCTIONS
Preventive Health Recommendations  Female Ages 26 - 39  Yearly exam:   See your health care provider every year in order to    Review health changes.     Discuss preventive care.      Review your medicines if you your doctor has prescribed any.    Until age 30: Get a Pap test every three years (more often if you have had an abnormal result).    After age 30: Talk to your doctor about whether you should have a Pap test every 3 years or have a Pap test with HPV screening every 5 years.   You do not need a Pap test if your uterus was removed (hysterectomy) and you have not had cancer.  You should be tested each year for STDs (sexually transmitted diseases), if you're at risk.   Talk to your provider about how often to have your cholesterol checked.  If you are at risk for diabetes, you should have a diabetes test (fasting glucose).  Shots: Get a flu shot each year. Get a tetanus shot every 10 years.   Nutrition:     Eat at least 5 servings of fruits and vegetables each day.    Eat whole-grain bread, whole-wheat pasta and brown rice instead of white grains and rice.    Get adequate Calcium and Vitamin D.     Lifestyle    Exercise at least 150 minutes a week (30 minutes a day, 5 days of the week). This will help you control your weight and prevent disease.    Limit alcohol to one drink per day.    No smoking.     Wear sunscreen to prevent skin cancer.    See your dentist every six months for an exam and cleaning.      Labs today  Follow up in one year for physical   Seek sooner medical attention if there is any worsening of symptoms or problems.

## 2018-09-27 ASSESSMENT — PATIENT HEALTH QUESTIONNAIRE - PHQ9: SUM OF ALL RESPONSES TO PHQ QUESTIONS 1-9: 0

## 2018-09-28 NOTE — PROGRESS NOTES
Breann Muniz,    This is to inform you regarding your test result.    HIV 1&2 Antibody is negative.  TSH which is thyroid hormone is normal.  Your total cholesterol is normal.  HDL which is called good cholesterol is normal.  Your LDL cholesterol is normal.  This is often call bad cholesterol and high levels increase the risk for heart attacks and strokes.  Your triglycerides are normal.  The testing of your blood sugar, kidney function, liver function and electrolytes was normal.  All results are very good.    Sincerely,      Dr.Nasima Alisson MD,FACP

## 2019-08-26 DIAGNOSIS — F41.8 DEPRESSION WITH ANXIETY: ICD-10-CM

## 2019-08-26 DIAGNOSIS — F33.8 SEASONAL AFFECTIVE DISORDER (H): ICD-10-CM

## 2019-08-26 NOTE — TELEPHONE ENCOUNTER
"Requested Prescriptions   Pending Prescriptions Disp Refills     FLUoxetine (PROZAC) 40 MG capsule [Pharmacy Med Name: FLUOXETINE HCL 40MG CAPS] 90 capsule 1     Sig: TAKE ONE CAPSULE BY MOUTH EVERY DAY  Last Written Prescription Date:  09/26/2018  Last Fill Quantity: 90 capsule,  # refills: 2   Last Office Visit: 9/26/2018 [place]  Future Office Visit:            SSRIs Protocol Failed - 8/26/2019 12:46 PM        Failed - PHQ-9 score less than 5 in past 6 months     PHQ-9 SCORE 10/11/2017 10/12/2017 9/26/2018   PHQ-9 Total Score - - -   PHQ-9 Total Score 0 0 0     GURPREET-7 SCORE 10/12/2017   Total Score 0               Failed - Recent (6 mo) or future (30 days) visit within the authorizing provider's specialty     Patient had office visit in the last 6 months or has a visit in the next 30 days with authorizing provider or within the authorizing provider's specialty.  See \"Patient Info\" tab in inbasket, or \"Choose Columns\" in Meds & Orders section of the refill encounter.            Passed - Medication is active on med list        Passed - Patient is age 18 or older        Passed - No active pregnancy on record        Passed - No positive pregnancy test in last 12 months        "

## 2019-08-27 RX ORDER — FLUOXETINE 40 MG/1
CAPSULE ORAL
Qty: 90 CAPSULE | Refills: 0 | Status: SHIPPED | OUTPATIENT
Start: 2019-08-27 | End: 2020-03-09

## 2019-12-15 ENCOUNTER — TRANSFERRED RECORDS (OUTPATIENT)
Dept: HEALTH INFORMATION MANAGEMENT | Facility: CLINIC | Age: 29
End: 2019-12-15

## 2019-12-15 LAB — RETINOPATHY: NORMAL

## 2020-01-17 ENCOUNTER — OFFICE VISIT (OUTPATIENT)
Dept: OBGYN | Facility: CLINIC | Age: 30
End: 2020-01-17
Payer: COMMERCIAL

## 2020-01-17 VITALS
HEIGHT: 66 IN | HEART RATE: 62 BPM | WEIGHT: 154 LBS | DIASTOLIC BLOOD PRESSURE: 68 MMHG | BODY MASS INDEX: 24.75 KG/M2 | SYSTOLIC BLOOD PRESSURE: 114 MMHG

## 2020-01-17 DIAGNOSIS — B37.2 YEAST DERMATITIS: Primary | ICD-10-CM

## 2020-01-17 PROCEDURE — 99213 OFFICE O/P EST LOW 20 MIN: CPT | Performed by: NURSE PRACTITIONER

## 2020-01-17 RX ORDER — FLUCONAZOLE 150 MG/1
150 TABLET ORAL
Qty: 3 TABLET | Refills: 0 | Status: SHIPPED | OUTPATIENT
Start: 2020-01-17 | End: 2020-03-09

## 2020-01-17 RX ORDER — NYSTATIN 100000 U/G
CREAM TOPICAL 2 TIMES DAILY
Qty: 15 G | Refills: 1 | Status: SHIPPED | OUTPATIENT
Start: 2020-01-17 | End: 2020-05-08

## 2020-01-17 ASSESSMENT — MIFFLIN-ST. JEOR: SCORE: 1430.29

## 2020-01-17 NOTE — PROGRESS NOTES
SUBJECTIVE:                                                   Cristy Eubanks is a 29 year old female who presents to clinic today for the following health issue(s):  Patient presents with:  Vaginal Problem: Rash outside vagina area         HPI:  Pt here today with c/o rash on her genital area for a few days. It happened in December but went away.     She does do laser hair removal. Hasn't had any issues with that at all.     She did get new underwear recently    Patient's last menstrual period was 2019..     Patient is sexually active, .  Using IUD for contraception.    reports that she has never smoked. She has never used smokeless tobacco.    STD testing offered?  Declined    Health maintenance updated:  yes    Today's PHQ-2 Score:   PHQ-2 (  [employer]) 10/12/2017   Q1: Little interest or pleasure in doing things 0   Q2: Feeling down, depressed or hopeless 0   PHQ-2 Score 0     Today's PHQ-9 Score:   PHQ-9 SCORE 2018   PHQ-9 Total Score -   PHQ-9 Total Score 0     Today's GURPREET-7 Score:   GURPREET-7 SCORE 10/12/2017   Total Score 0       Problem list and histories reviewed & adjusted, as indicated.  Additional history: as documented.    Patient Active Problem List   Diagnosis     Seasonal affective disorder (H)     CARDIOVASCULAR SCREENING; LDL GOAL LESS THAN 160     Depression with anxiety     Acute pain of right shoulder     Family history of breast cancer     Presence of intrauterine contraceptive device     Past Surgical History:   Procedure Laterality Date     APPENDECTOMY        Social History     Tobacco Use     Smoking status: Never Smoker     Smokeless tobacco: Never Used   Substance Use Topics     Alcohol use: Yes     Alcohol/week: 0.0 standard drinks     Comment: 1 x per month       Problem (# of Occurrences) Relation (Name,Age of Onset)    Breast Cancer (2) Maternal Grandmother, Paternal Grandmother    Cancer (2) Maternal Grandmother, Paternal Grandmother    Hypertension (1)  "Mother       Negative family history of: Diabetes, Thyroid Disease, Glaucoma, Macular Degeneration            Current Outpatient Medications   Medication Sig     fluconazole (DIFLUCAN) 150 MG tablet Take 1 tablet (150 mg) by mouth every 3 days     levonorgestrel (MIRENA, 52 MG,) 20 MCG/24HR IUD 1 each (20 mcg) by Intrauterine route once for 1 dose     nystatin (MYCOSTATIN) 082153 UNIT/GM external cream Apply topically 2 times daily , external use only. To genital area     buPROPion (WELLBUTRIN XL) 300 MG 24 hr tablet Take 1 tablet (300 mg) by mouth every morning (Patient not taking: Reported on 1/17/2020)     FLUoxetine (PROZAC) 40 MG capsule TAKE ONE CAPSULE BY MOUTH EVERY DAY (Patient not taking: Reported on 1/17/2020)     FLUoxetine (PROZAC) 40 MG capsule Take 1 capsule (40 mg) by mouth daily (Patient not taking: Reported on 1/17/2020)     No current facility-administered medications for this visit.      Allergies   Allergen Reactions     No Known Allergies        ROS:  12 point review of systems negative other than symptoms noted below or in the HPI.  No urinary frequency or dysuria, bladder or kidney problems      OBJECTIVE:     /68   Pulse 62   Ht 1.676 m (5' 6\")   Wt 69.9 kg (154 lb)   LMP 12/17/2019   BMI 24.86 kg/m    Body mass index is 24.86 kg/m .    Exam:  Constitutional:  Appearance: Well nourished, well developed alert, in no acute distress  Psychiatric:  Mentation appears normal and affect normal/bright.  Pelvic Exam:  External Genitalia:     Normal appearance for age, no discharge present, no tenderness present, no inflammatory lesions present, color normal- RED, RAISED RASH JUST ABOVE CLITORAL RICE. NO LESIONS/BLISTERS. 7CM DIAMETER  Perineum:     Perineum within normal limits, no evidence of trauma, no rashes or skin lesions present  Inguinal Lymph Nodes:     No lymphadenopathy present  Pubic Hair:     Normal pubic hair distribution for age  Genitalia and Groin:     No rashes present, no " lesions present, no areas of discoloration, no masses present       In-Clinic Test Results:  No results found for this or any previous visit (from the past 24 hour(s)).    ASSESSMENT/PLAN:                                                        ICD-10-CM    1. Yeast dermatitis B37.2 fluconazole (DIFLUCAN) 150 MG tablet     nystatin (MYCOSTATIN) [id number] UNIT/GM external cream       There are no Patient Instructions on file for this visit.    Will try topical nystatin and hydrocortisone cream for 2 weeks. With oral diflucan. Avoid shaving.     RTC if not resolved or worse in 2-3 weeks.    JORGE Nascimento CNP  [facility]

## 2020-03-09 ENCOUNTER — OFFICE VISIT (OUTPATIENT)
Dept: FAMILY MEDICINE | Facility: CLINIC | Age: 30
End: 2020-03-09
Payer: COMMERCIAL

## 2020-03-09 VITALS
TEMPERATURE: 98.4 F | SYSTOLIC BLOOD PRESSURE: 125 MMHG | OXYGEN SATURATION: 96 % | BODY MASS INDEX: 24.27 KG/M2 | HEIGHT: 66 IN | DIASTOLIC BLOOD PRESSURE: 79 MMHG | HEART RATE: 100 BPM | WEIGHT: 151 LBS

## 2020-03-09 DIAGNOSIS — F33.8 SEASONAL AFFECTIVE DISORDER (H): ICD-10-CM

## 2020-03-09 DIAGNOSIS — R11.0 NAUSEA: ICD-10-CM

## 2020-03-09 DIAGNOSIS — D22.9 NUMEROUS MOLES: Primary | ICD-10-CM

## 2020-03-09 PROCEDURE — 99213 OFFICE O/P EST LOW 20 MIN: CPT | Performed by: INTERNAL MEDICINE

## 2020-03-09 RX ORDER — ONDANSETRON 4 MG/1
4 TABLET, ORALLY DISINTEGRATING ORAL EVERY 6 HOURS PRN
Qty: 30 TABLET | Refills: 0 | Status: SHIPPED | OUTPATIENT
Start: 2020-03-09 | End: 2020-05-08

## 2020-03-09 ASSESSMENT — MIFFLIN-ST. JEOR: SCORE: 1416.68

## 2020-03-09 NOTE — PROGRESS NOTES
Subjective     Cristy Eubanks is a 29 year old female who presents to clinic today for the following health issues:    HPI     Skin lesion by left ear  Pt noticed this a week ago   It is starting to peel per pt  No family history of skin cancer  She has many moles across her body    Seasonal affective disorder  She is much better now  Is not taking anymore medication  Has been seeing a counselor and doing alternative things to keep it under control    Fungal infection  Fungal infection under right armpit  Pt has nystatin cream and anti-itch cream that she will use on the affected area    Medication request  Pt is requesting a prescription for Zofran  She is going on a mission trip to [facility]       Patient Active Problem List   Diagnosis     Seasonal affective disorder (H)     CARDIOVASCULAR SCREENING; LDL GOAL LESS THAN 160     Depression with anxiety     Acute pain of right shoulder     Family history of breast cancer     Presence of intrauterine contraceptive device     Past Surgical History:   Procedure Laterality Date     APPENDECTOMY  1997       Social History     Tobacco Use     Smoking status: Never Smoker     Smokeless tobacco: Never Used   Substance Use Topics     Alcohol use: Yes     Alcohol/week: 0.0 standard drinks     Comment: 1 x per month      Family History   Problem Relation Age of Onset     Hypertension Mother      Breast Cancer Maternal Grandmother      Cancer Maternal Grandmother      Breast Cancer Paternal Grandmother      Cancer Paternal Grandmother      Diabetes No family hx of      Thyroid Disease No family hx of      Glaucoma No family hx of      Macular Degeneration No family hx of            Current Outpatient Medications   Medication Sig Dispense Refill     levonorgestrel (MIRENA, 52 MG,) 20 MCG/24HR IUD 1 each (20 mcg) by Intrauterine route once for 1 dose 1 each 0     nystatin (MYCOSTATIN) 970511 UNIT/GM external cream Apply topically 2 times daily , external use only. To genital area  "15 g 1     Allergies   Allergen Reactions     No Known Allergies        Medications and Labs reviewed in EPIC    Reviewed and updated as needed this visit by Provider         Review of Systems   ROS COMP: Constitutional, HEENT, cardiovascular, pulmonary, GI, , musculoskeletal, neuro, skin, endocrine and psych systems are negative, except as otherwise noted.    POSITIVE for skin lesion near left ear  POSITIVE for fungal infection under right armpit    This document serves as a record of the services and decisions personally performed and made by Abby Vinson MD. It was created on her behalf by Reina Young, a trained medical scribe. The creation of this document is based on the provider's statements to the medical scribe.  Reina Young 10:34 AM March 9, 2020        Objective    /79 (BP Location: Right arm, Patient Position: Chair, Cuff Size: Adult Regular)   Pulse 100   Temp 98.4  F (36.9  C) (Tympanic)   Ht 1.676 m (5' 6\")   Wt 68.5 kg (151 lb)   SpO2 96%   BMI 24.37 kg/m    Body mass index is 24.37 kg/m .  Physical Exam   GENERAL APPEARANCE: healthy, alert and no distress  SKIN: lesion near left ear that looks benign upon exam, many moles and freckles across body  PSYCH: mentation appears normal, affect normal/bright      Diagnostic Test Results:  Labs reviewed in Epic  none         Assessment & Plan     Cristy was seen today for derm problem.    Diagnoses and all orders for this visit:    Numerous moles  -     SKIN CARE REFERRAL  She is here today regarding a concern about a skin lesion near left ear  Pt noticed this about a week ago and wanted to have it checked  She has no family history of skin cancer  Has many benign moles all over her body  Informed her about ABCD of skin cancer screening  The lesion looks symmetric upon exam  Informed pt that it looks benign upon exam  We told pt that she can see a skin specialist once every few years due to her many moles  We have put a referral for skin " specialist today  She will call and schedule an appointment for this    Seasonal affective disorder (H)  She is much better per pt  Is not taking medication for this anymore  She is using alternative methods to keep things under control  Has been seeing a counselor recently    Nausea  -     ondansetron (ZOFRAN-ODT) 4 MG ODT tab; Take 1 tablet (4 mg) by mouth every 6 hours as needed for nausea  Patient is requesting this as an emergency medication for her upcoming mission trip to [facility]           Patient Instructions   Make appointment with skin specialist   Radha Campos or   Follow up for your annual physical      The information in this document, created by the medical scribe for me, accurately reflects the services I personally performed and the decisions made by me. I have reviewed and approved this document for accuracy prior to leaving the patient care area.  March 9, 2020 10:51 AM    Abby Vinson MD  [facility]

## 2020-03-19 ENCOUNTER — OFFICE VISIT (OUTPATIENT)
Dept: URGENT CARE | Facility: URGENT CARE | Age: 30
End: 2020-03-19
Payer: COMMERCIAL

## 2020-03-19 VITALS
OXYGEN SATURATION: 100 % | WEIGHT: 155.2 LBS | HEART RATE: 72 BPM | DIASTOLIC BLOOD PRESSURE: 83 MMHG | TEMPERATURE: 98 F | RESPIRATION RATE: 18 BRPM | BODY MASS INDEX: 25.05 KG/M2 | SYSTOLIC BLOOD PRESSURE: 133 MMHG

## 2020-03-19 DIAGNOSIS — N30.00 ACUTE CYSTITIS WITHOUT HEMATURIA: ICD-10-CM

## 2020-03-19 DIAGNOSIS — R30.0 DYSURIA: Primary | ICD-10-CM

## 2020-03-19 LAB

## 2020-03-19 PROCEDURE — 99213 OFFICE O/P EST LOW 20 MIN: CPT | Performed by: PHYSICIAN ASSISTANT

## 2020-03-19 PROCEDURE — 81001 URINALYSIS AUTO W/SCOPE: CPT | Performed by: PHYSICIAN ASSISTANT

## 2020-03-19 RX ORDER — NITROFURANTOIN 25; 75 MG/1; MG/1
100 CAPSULE ORAL 2 TIMES DAILY
Qty: 10 CAPSULE | Refills: 0 | Status: SHIPPED | OUTPATIENT
Start: 2020-03-19 | End: 2020-05-08

## 2020-03-20 NOTE — PROGRESS NOTES
SUBJECTIVE:  Cristy is a 29 year old female who presents to urgent care with 1 day of incomplete emptying and urgency.  She denies any vaginal discharge, lesions, erythema.  She has some infrequent irritation with no specific pattern.  She denies any dysuria, malodor, fevers, flank pain, back pain, abdominal pain.  She does have history of kidney stones and had 1 within the last few days.  She will be seeing her urologist in 3 days.  She does have a history of urinary tract infection.  Patient works as an ER nurse    Chief Complaint   Patient presents with     Urinary Problem     Pressure, Urgency with urination x this morning      ROS: as stated in HPI, otherwise negative    Past Medical History:   Diagnosis Date     Anxiety      Depression      Seasonal affective disorder (H)       Social History     Socioeconomic History     Marital status: Single     Spouse name: Not on file     Number of children: Not on file     Years of education: Not on file     Highest education level: Not on file   Occupational History     Employer: [employer]   Social Needs     Financial resource strain: Not on file     Food insecurity     Worry: Not on file     Inability: Not on file     Transportation needs     Medical: Not on file     Non-medical: Not on file   Tobacco Use     Smoking status: Never Smoker     Smokeless tobacco: Never Used   Substance and Sexual Activity     Alcohol use: Yes     Alcohol/week: 0.0 standard drinks     Comment: 1 x per month      Drug use: No     Sexual activity: Yes     Partners: Male     Birth control/protection: I.U.D.   Lifestyle     Physical activity     Days per week: Not on file     Minutes per session: Not on file     Stress: Not on file   Relationships     Social connections     Talks on phone: Not on file     Gets together: Not on file     Attends Buddhist service: Not on file     Active member of club or organization: Not on file     Attends meetings of clubs or organizations: Not on file      Relationship status: Not on file     Intimate partner violence     Fear of current or ex partner: Not on file     Emotionally abused: Not on file     Physically abused: Not on file     Forced sexual activity: Not on file   Other Topics Concern     Parent/sibling w/ CABG, MI or angioplasty before 65F 55M? Not Asked   Social History Narrative    Feels safe at home.          Current Outpatient Medications:      levonorgestrel (MIRENA, 52 MG,) 20 MCG/24HR IUD, 1 each (20 mcg) by Intrauterine route once for 1 dose, Disp: 1 each, Rfl: 0     nystatin (MYCOSTATIN) [id number] UNIT/GM external cream, Apply topically 2 times daily , external use only. To genital area, Disp: 15 g, Rfl: 1     ondansetron (ZOFRAN-ODT) 4 MG ODT tab, Take 1 tablet (4 mg) by mouth every 6 hours as needed for nausea, Disp: 30 tablet, Rfl: 0     OBJECTIVE:  /83   Pulse 72   Temp 98  F (36.7  C) (Oral)   Resp 18   Wt 70.4 kg (155 lb 3.2 oz)   SpO2 100%   BMI 25.05 kg/m     GENERAL APPEARANCE: Appears well and in no acute distress  HEENT: HEAD: NCAT, EOMI, Moist mucous membranes  ABD: Non distended,  No CVA tenderness  LUNGS: No respiratory distress   NUERO: AOx3, normal mentation  PSYCH: normal mood and affect    Results for orders placed or performed in visit on 03/19/20   UA with Microscopic reflex to Culture     Status: Abnormal    Specimen: Midstream Urine   Result Value Ref Range    Color Urine Yellow     Appearance Urine Slightly Cloudy     Glucose Urine Negative NEG^Negative mg/dL    Bilirubin Urine Negative NEG^Negative    Ketones Urine Negative NEG^Negative mg/dL    Specific Gravity Urine 1.010 1.003 - 1.035    pH Urine 7.0 5.0 - 7.0 pH    Protein Albumin Urine Negative NEG^Negative mg/dL    Urobilinogen Urine 0.2 0.2 - 1.0 EU/dL    Nitrite Urine Negative NEG^Negative    Blood Urine Negative NEG^Negative    Leukocyte Esterase Urine Negative NEG^Negative    Source Midstream Urine     WBC Urine 0 - 5 OTO5^0 - 5 /HPF    RBC Urine O - 2  OTO2^O - 2 /HPF    Squamous Epithelial /LPF Urine Few FEW^Few /LPF    Bacteria Urine Few (A) NEG^Negative /HPF    Amorphous Crystals Moderate (A) NEG^Negative /HPF        ASSESSMENT/PLAN:  Cristy was seen today for urinary problem.    Diagnoses and all orders for this visit:    Dysuria  -     UA with Microscopic reflex to Culture    Acute cystitis without hematuria  -     nitroFURantoin macrocrystal-monohydrate (MACROBID) 100 MG capsule; Take 1 capsule (100 mg) by mouth 2 times daily for 5 days      1) her symptoms seem to be more related to ureteral or bladder spasm especially given her history of recent kidney stones.  She does have some bacteria seen on microscopy but no white blood cells or leukocyte esterase and this could be contamination.  However, patient does work strange hours in the emergency room and given the state of COVID-19 I want to supply an antibiotic for patient to  to limit her exposure at a repeat visit.  We discussed signs and symptoms that would warrant beginning antibiotic otherwise we will wait for culture to come back.  She also has a follow-up with her urologist in 3 days.  We discussed signs and symptoms that would warrant further evaluation from a health care provider. The plan of care was reviewed and discussed.They understand and agree with the plan. A printed summary was given including instructions and medications.    Devyn Matthews PA-C

## 2020-04-29 ENCOUNTER — OFFICE VISIT (OUTPATIENT)
Dept: MIDWIFE SERVICES | Facility: CLINIC | Age: 30
End: 2020-04-29
Payer: COMMERCIAL

## 2020-04-29 VITALS
HEIGHT: 66 IN | SYSTOLIC BLOOD PRESSURE: 102 MMHG | WEIGHT: 146 LBS | HEART RATE: 60 BPM | DIASTOLIC BLOOD PRESSURE: 66 MMHG | BODY MASS INDEX: 23.46 KG/M2

## 2020-04-29 DIAGNOSIS — B37.31 YEAST VAGINITIS: Primary | ICD-10-CM

## 2020-04-29 LAB
GRAM STN SPEC: ABNORMAL
Lab: ABNORMAL
SPECIMEN SOURCE: ABNORMAL
SPECIMEN SOURCE: NORMAL
WET PREP SPEC: NORMAL

## 2020-04-29 PROCEDURE — 87102 FUNGUS ISOLATION CULTURE: CPT | Performed by: ADVANCED PRACTICE MIDWIFE

## 2020-04-29 PROCEDURE — 87210 SMEAR WET MOUNT SALINE/INK: CPT | Performed by: ADVANCED PRACTICE MIDWIFE

## 2020-04-29 PROCEDURE — 99214 OFFICE O/P EST MOD 30 MIN: CPT | Performed by: ADVANCED PRACTICE MIDWIFE

## 2020-04-29 PROCEDURE — 87205 SMEAR GRAM STAIN: CPT | Performed by: ADVANCED PRACTICE MIDWIFE

## 2020-04-29 ASSESSMENT — MIFFLIN-ST. JEOR: SCORE: 1394

## 2020-04-29 NOTE — PATIENT INSTRUCTIONS
Vaginitis (Vaginal Irritation/Infection)    Vaginitis is very common!  The most common vaginal infections are bacterial vaginosis or yeast. These infections are not sexually transmitted but can be incredibly uncomfortable. Seek care from your midwife if signs or symptoms arise.     Normal vaginal discharge:      Is white, clear, thick or thin (it may change depending on where you are in your cycle)    Does not have a foul odor    The amount of discharge varies    Abnormal discharge/symptoms:       Itching in and around the vagina    Redness, pain or swelling    Discharge that is foamy, greenish, curd like, or bloody    Foul smelling odor    Pain when urinating or having sex    Fever    Causes of vaginal infections:      Good bacteria from the vagina have been destroyed by bad bacteria    Reaction to something in the vagina such as a tampon or scented/perfumed soaps or bubble bath    STI's    Sensitivities to soaps/detergents/dryer sheets, lubricants, etc.    Hormonal changes    Recent use of antibiotics     Infections can also occur after you've had intercourse with a new partner or if you have had frequent intercourse         Here is a list of suggestions that may help prevent/treat vaginal infections and will help maintain a healthy vaginal environment:      1.  Boosting your immune system so you can heal faster      Make sure you are getting adequate sleep    Drink 2-3 quarts of fluids per day, Cranberries or cranberry juice (unsweetened)    Eat more nuts, grains, raw veggies, yogurt, elva, grapefruit    Decrease intake of refined sugar, red meat and alcohol    Echinacea - 3 times a day for chronic problem or every 2 hours for acute symptoms; use as directed on bottle          2.  Changing the vaginal environment to a more acid state       Soak in a warm bath tub with one cup of vinegar or lemon juice. Do not use scented soap, bubble bath, or oils.       3.  Increasing the good healthy bacteria      At each  meal drink 1 tsp apple cider vinegar and 1 tsp honey in   cup warm water    Eat garlic daily, capsules or fresh.      Take probiotics 4-8 billion units/day      4.  Preventive measures      Wear cotton underwear, no thongs.  Do not wear tight clothes or pantyhose    Shower soon after working or change out of sweaty clothing     Do not wear underwear to bed.  The vaginal environment needs to breathe    Never douche or use vaginal , the vagina is self-cleaning!    Use white, unscented toilet paper.  Do not use baby wipes.  Wipe from front to back    Use only unscented tampons and pads, buy organic products if desired    Do not use perfumes/oils/lotions near your vagina or take bubble baths    Use only mild, unscented soaps around your vaginal area     Do not use fabric softeners/dryer sheets    Use gentle, unscented detergent, consider buying non-petroleum based detergents    Use only water based lubricants during sexual contact    Abstain from intercourse during times of infection      If your symptoms do not resolve or if you have questions please call:     [facility]   [phone]

## 2020-04-29 NOTE — PROGRESS NOTES
SUBJECTIVE:   Cristy is a 29 year old here for vaginal symptoms: Having an ongoing rash. Was evaluated in January and diagnosed with yeast dermatitis. Prescription provided for Diflucan and Nystatin. Feels it helped some, but since the end of March, beginning of April she has been experiencing a different kind of rash lower on the outer labia, inner labia and possibly near the anus. This rash seems more painful. Complains of redness with some burning, but without itching.  Previously, the rash in January was more on her mons pubis and was red, raised and pruritic.     Has tried using Nystatin cream again over the last two weeks, and seems unsure if it is working.     Denies any changes in detergents, or clothing. Does mention having laser hair removal in February and needing to shave her pubic area before the laser session. Has not shaved since. Has her next appt next week.     Denies changes in vaginal discharge.   Vaginal Symptoms     Onset: Intermittently since January    Description:  Vaginal Discharge: Brownish from spotting. Usually clear. Unsure if the Nystatin cream in being pushed up internally after  wiping causing it appear like there is more discharge.   Itching (Pruritis): No  Burning sensation:  No  Odor:  No  Irritation:  Yes    Accompanying Signs & Symptoms:  Pain with Urination: No  Abdominal Pain:  No  Fever: No   History:   Sexually active:  No  New Partner:  No  Possibility of Pregnancy:  No  Contraceptive type: IUD   Mirena, has noticed a change in vaginal discharge since placement    Precipitating factors:   Recent Antibiotic Use: No    Alleviating factors:  Using Nystatin   Therapies Tried and outcome:   Previous Episodes of Vaginitis:  Yes: Was evaluated in [facility], diagnoses with yeast dermatitis and prescribed Diflucan and Nystatin cream      Other associated symptoms: none.  History of STI's:  No  STI Testing offered: YES, but declined.     LMP: Patient's last menstrual period was  "04/03/2020.      Patient Active Problem List   Diagnosis     Seasonal affective disorder (H)     CARDIOVASCULAR SCREENING; LDL GOAL LESS THAN 160     Depression with anxiety     Acute pain of right shoulder     Family history of breast cancer     Presence of intrauterine contraceptive device     Past Medical History:   Diagnosis Date     Anxiety      Depression      Seasonal affective disorder (H)      Past Surgical History:   Procedure Laterality Date     APPENDECTOMY  1997     Current Outpatient Medications   Medication Sig Dispense Refill     levonorgestrel (MIRENA, 52 MG,) 20 MCG/24HR IUD 1 each (20 mcg) by Intrauterine route once for 1 dose 1 each 0     nystatin (MYCOSTATIN) [id number] UNIT/GM external cream Apply topically 2 times daily , external use only. To genital area (Patient not taking: Reported on 4/29/2020) 15 g 1     ondansetron (ZOFRAN-ODT) 4 MG ODT tab Take 1 tablet (4 mg) by mouth every 6 hours as needed for nausea (Patient not taking: Reported on 4/29/2020) 30 tablet 0     Allergies   Allergen Reactions     No Known Allergies        Health maintenance updated:  yes    ROS:   12 point review of systems negative other than symptoms noted below or in the HPI.  Genitourinary: Vaginal irritation and redness    PHYSICAL EXAM:    /66   Pulse 60   Ht 1.676 m (5' 6\")   Wt 66.2 kg (146 lb)   LMP 04/03/2020   BMI 23.57 kg/m  , Body mass index is 23.57 kg/m .  General appearance:  healthy, alert and no distress  Pelvic Exam:  Vulva: No lesions, no adenopathy. Mild redness on outer and inner labia.   Vagina: Moist, pink, discharge, thick yellowish/gray and chunky,  well rugated, no lesions  Cervix:smooth, pink, no visible lesions, IUD strings visible  Rectal exam: Deferred    ASSESSMENT/PLAN:     ICD-10-CM    1. Yeast vaginitis  B37.3 Wet prep     Gram stain     Yeast culture       Results for orders placed or performed in visit on 04/29/20   Wet prep     Status: None    Specimen: Vagina   Result Value " Ref Range    Specimen Description Vagina     Wet Prep No Trichomonas seen     Wet Prep No clue cells seen     Wet Prep No yeast seen     Wet Prep Many  WBC'S seen            COUNSELING:  Discussed possibility of thick vaginal discharge a result of the Nystatin cream being mixed in after wiping.   Discussed continuing to use Nystatin cream or Gynelotrimin/Vagisil cream for external vaginal irritation.  Advised not to have laser treatment done or shave  Will call with lab results and treat if needed.   Abstain from sexual intercourse while being treated for vaginal infection  Handout provided about vaginitis and how to prevent future infections.  Continue monitoring symptoms, if worsens, would consider treatment with a mild steroid cream or referral to MD  Pap due 10/2020  Return to clinic if symptoms persist or worsen    Sana PATEL CNM    25 minutes was spent face to face with the patient today discussing her history, diagnosis, and follow-up plan as noted above. Over 50% of the visit was spent in counseling and coordination of care.    Total Visit Time: 25 minutes.

## 2020-04-30 NOTE — RESULT ENCOUNTER NOTE
Awaiting yeast culture results. Pt may need gc/ct testing, has not had recently.     Amelia Rosales, APRN, CNM

## 2020-05-04 DIAGNOSIS — Z11.3 ROUTINE SCREENING FOR STI (SEXUALLY TRANSMITTED INFECTION): Primary | ICD-10-CM

## 2020-05-04 LAB
Lab: NORMAL
SPECIMEN SOURCE: NORMAL
YEAST SPEC QL CULT: NORMAL

## 2020-05-04 NOTE — RESULT ENCOUNTER NOTE
Please call Cristy and let her know the yeast culture was negative and the gram stain did not show signs of BV. Pt declined STI testing at visit and has not had recently, gram stained showed moderate amount of white blood cells, she should return for lab only visit and leave a urine to test for gonorrhea and chlamydia. GC/CT orders futured.     JORGE Marcelo, CNM

## 2020-05-08 ENCOUNTER — VIRTUAL VISIT (OUTPATIENT)
Dept: FAMILY MEDICINE | Facility: CLINIC | Age: 30
End: 2020-05-08
Payer: COMMERCIAL

## 2020-05-08 DIAGNOSIS — F41.0 PANIC ATTACK: ICD-10-CM

## 2020-05-08 DIAGNOSIS — F41.8 DEPRESSION WITH ANXIETY: Primary | ICD-10-CM

## 2020-05-08 DIAGNOSIS — Z11.3 ROUTINE SCREENING FOR STI (SEXUALLY TRANSMITTED INFECTION): ICD-10-CM

## 2020-05-08 DIAGNOSIS — F33.8 SEASONAL AFFECTIVE DISORDER (H): ICD-10-CM

## 2020-05-08 PROCEDURE — 96127 BRIEF EMOTIONAL/BEHAV ASSMT: CPT | Performed by: INTERNAL MEDICINE

## 2020-05-08 PROCEDURE — 99213 OFFICE O/P EST LOW 20 MIN: CPT | Mod: GT | Performed by: INTERNAL MEDICINE

## 2020-05-08 PROCEDURE — 87591 N.GONORRHOEAE DNA AMP PROB: CPT | Performed by: ADVANCED PRACTICE MIDWIFE

## 2020-05-08 PROCEDURE — 87491 CHLMYD TRACH DNA AMP PROBE: CPT | Performed by: ADVANCED PRACTICE MIDWIFE

## 2020-05-08 RX ORDER — BUPROPION HYDROCHLORIDE 150 MG/1
150 TABLET ORAL EVERY MORNING
Qty: 90 TABLET | Refills: 1 | Status: SHIPPED | OUTPATIENT
Start: 2020-05-08 | End: 2022-03-21

## 2020-05-08 RX ORDER — LORAZEPAM 0.5 MG/1
0.5 TABLET ORAL 2 TIMES DAILY PRN
Qty: 15 TABLET | Refills: 1 | Status: SHIPPED | OUTPATIENT
Start: 2020-05-08 | End: 2022-03-21

## 2020-05-08 ASSESSMENT — PATIENT HEALTH QUESTIONNAIRE - PHQ9: SUM OF ALL RESPONSES TO PHQ QUESTIONS 1-9: 22

## 2020-05-08 NOTE — Clinical Note
Please abstract the following data from this visit with this patient into the appropriate field in Epic:    Tests that can be patient reported without a hard copy:    {Quality Abstract List (Optional):553772}    Other Tests found in the patient's chart through Chart Review/Care Everywhere:    Eye exam with ophthalmology on this date: 12/15/2019 at [facility]    Note to [employer]: If this section is blank, no results were found via Chart Review/Care Everywhere.

## 2020-05-08 NOTE — PATIENT INSTRUCTIONS
Start taking your antidepressant medication  I have prescribed Prozac and Wellbutrin which you have taken in the past  Take Wellbutrin 150 mg daily  Take Prozac 20 mg daily for 7 days and then go to 2 capsules which is (40 mg total dose) daily as a maintenance  After you finish your current supply of Prozac we can switch you to Prozac 40 mg 1 capsule daily  Lorazepam  can be habit-forming. It should be taken as prescribed. Do not mix it  with alcohol. Be careful with driving.Do not loose the  Prescription.  Do not overuse this medication. It is a controlled substance.  Please make appointment with your counselor.  If you need referral then let me know  Lorazepam is for short period of time until your medication kicks in.  Follow up in one week.  Seek sooner medical attention if there is any worsening of symptoms or problems.          Patient Education     Major Depression  What is depression?   Depression is a serious mood disorder that affects your whole body including your mood and thoughts. It touches every part of your life. It s important to know that depression is not a weakness or character flaw. It s a chemical imbalance in your brain that needs to be treated.  If you have one episode of depression, you are at risk of having more throughout life.  If you don t get treatment, depression can happen more often and be more serious.   What causes depression?   Depression is caused by an imbalance of brain chemicals. Other factors also play a role. It also tends to run in families. Depression can be triggered by life events or certain illnesses. It can also develop without a clear trigger.  What are the symptoms of depression?   While each person may experience symptoms differently, these are the most common symptoms of depression:    Lasting sad, anxious, or  empty  mood    Loss of interest in almost all activities    Appetite and weight changes    Changes in sleep patterns, such as inability to sleep or sleeping  "too much    Slowing of physical activity, speech, and thinking OR agitation, increased restlessness, and irritability    Decreased energy, feeling tired or \"slowed down\" almost every day    Ongoing feelings of worthlessness or feelings of undue guilt    Trouble concentrating or making decisions    Repeating thoughts of death or suicide, wishing to die, or attempting suicide (Note: This needs emergency treatment )  If you have 5 or more of these symptoms for at least 2 weeks, you may be diagnosed with depression. These symptoms would be a noticeable change from what s  normal  for you  The symptoms of depression may look like other mental health conditions. Always see a healthcare provider for a diagnosis.  How is depression diagnosed?   Depression can happen along with other medical conditions. These include heart disease, or cancer, as well as other mental health conditions. Early diagnosis and treatment is key to recovery.  A diagnosis is made after a careful mental health exam and medical history done. This is usually done by a mental health professional.  How is depression treated?   Treatment for depression may include one or a combination of the following:    Medicine. Antidepressants work by affecting the brain chemicals. Know that it takes 4 to 8 weeks for these medicines to have a full effect. Keep taking the medicine, even if it doesn t seem to be working at first. Never stop taking your medicine without first talking to your healthcare provider. Some people have to switch medicines or add medicines to get results. Work closely with your healthcare provider to find treatment that works for you.    Therapy. This is most often cognitive behavioral or interpersonal therapy. It focuses on changing the distorted views you have of yourself and your situation. It also works to improve relationships, and identify and manage stressors in your life.    Electroconvulsive therapy (ECT). This treatment may be used to " treat severe, life-threatening depression that has not responded to medicines. A mild electrical current is passed through the brain. This triggers a brief seizure. For unknown reasons, the seizures help restore the normal balance of chemicals in the brain and ease symptoms.  With treatment, you should start to feel better within a few weeks. Without treatment, symptoms can last for weeks, months, or even years. Continued treatment may help to prevent depression from appearing again.  Depression can make you feel exhausted, worthless, helpless, and hopeless. It s important to realize that these negative views are part of the depression and don't reflect reality. Negative thinking fades as treatment starts to take effect. Meanwhile, consider the following:    Get help. Some research shows that if depression is treated as soon as possible, long-term problems are decreased. If you think you may be depressed, see a healthcare provider as soon as possible.    Set realistic goals in light of the depression and don t take on too much.    Break large tasks into small ones. Set priorities, and do what you can as you can.    Try to be with other people and confide in someone. It s usually better than being alone and secretive.    Do things that make you feel better. Going to a movie, gardening, or taking part in [employer], social, or other activities may help. Doing something nice for someone else can also help you feel better.    Get regular exercise, studies show exercise can improve mood.    Expect your mood to get better slowly, not right away. Feeling better takes time.    Eat healthy, well-balanced meals.    Stay away from alcohol and drugs. These can make depression worse.    It's best to delay important decisions until the depression has lifted. Before deciding to make a big change --change jobs, get  or  -- discuss it with others who know you well and have a more objective view of your  "situation.    Remember: People don t \"snap out of\" a depression. But they can feel a little better day-by-day.    Try to be patient and focus on the positives. This may help replace the negative thinking that is part of the depression. The negative thoughts will fade as your depression responds to treatment.    Let your family and friends help you  When should I call my healthcare provider?  If you have 5 or more of these symptoms for at least 2 weeks, call your healthcare provider:     Lasting sad, anxious, or  empty  mood    Loss of interest in almost all activities    Appetite and weight changes    Changes in sleep patterns, such as inability to sleep or sleeping too much    Slowing of physical activity, speech, and thinking OR agitation, increased restlessness, and irritability    Decreased energy, feeling tired or \"slowed down\" almost every day    Ongoing feelings of worthlessness or feelings of undue guilt    Trouble concentrating or making decisions    Repeating thoughts of death or suicide, wishing to die, or attempting suicide (Note: This needs emergency treatment )    If you have thoughts of harming yourself, tell someone right away. Call 911 or go to a hospital emergency room. Ask a friend or family member to stay with you. Don't stay alone. You can also call the toll-free 24-hour hotline of the National Suicide Prevention Lifeline at 800-273-talk (787.716.4763); TTY: 035-669-1GLM (3422) and talk to a trained counselor.  Key points about depression    Depression is a serious mood disorder that affects your whole body including your mood and thoughts.    It s caused by a chemical imbalance in the brain. Some types of depression seem to run in families.    Depression causes ongoing, extreme feelings of sadness, helplessness, hopeless, and irritability. These feelings are usually a noticeable change from what s  normal  for you, and they last for more than 2 weeks.    Depression may be diagnosed after a " careful psychiatric exam and medical history done by a mental health professional.    Depression is most often treated with medicine or therapy, or a combination of both.    Next steps  Tips to help you get the most from a visit to your healthcare provider:    Know the reason for your visit and what you want to happen.    Before your visit, write down questions you want answered.    Bring someone with you to help you ask questions and remember what your provider tells you.    At the visit, write down the name of a new diagnosis, and any new medicines, treatments, or tests. Also write down any new instructions your provider gives you.    Know why a new medicine or treatment is prescribed, and how it will help you. Also know what the side effects are.    Ask if your condition can be treated in other ways.    Know why a test or procedure is recommended and what the results could mean.    Know what to expect if you do not take the medicine or have the test or procedure.    If you have a follow-up appointment, write down the date, time, and purpose for that visit.    Know how you can contact your provider if you have questions.      0127-7667 [employer]. [address] [place], [place] [address]. All rights reserved. This information is not intended as a substitute for professional medical care. Always follow your healthcare professional's instructions.           Patient Education     Understanding Anxiety Disorders  Almost everyone gets nervous now and then. It s normal to have knots in your stomach before a test, or for your heart to race on a first date. But an anxiety disorder is much more than a case of nerves. In fact, its symptoms may be overwhelming. But treatment can relieve many of these symptoms. Talking to your healthcare provider is the first step.    What are anxiety disorders?  An anxiety disorder causes intense feelings of panic and fear. These feelings may arise for no apparent reason. And  they tend to recur again and again. They may prevent you from coping with life and cause you great distress. As a result, you may avoid anything that triggers your fear. In extreme cases, you may never leave the house. Anxiety disorders may cause other symptoms, such as:    Obsessive thoughts that are unwanted and you can t control    Constant nightmares or painful thoughts of the past    Nausea, sweating, and muscle tension    Trouble sleeping or concentrating  What causes anxiety disorders?  Anxiety disorders tend to run in families. For some people, childhood abuse or neglect may play a role. For others, stressful life events or trauma may trigger anxiety disorders. Anxiety can trigger low self-esteem and poor coping skills.    Panic disorder. This causes an intense fear of being in danger.    Phobias. These are extreme fears of certain objects, places, or events.    Obsessive-compulsive disorder. This causes you to have unwanted thoughts and urges. You also may perform certain actions over and over.    Posttraumatic stress disorder. This occurs in people who have survived a terrible ordeal. It can cause nightmares and flashbacks about the event.    Generalized anxiety disorder. This causes constant worry that can greatly disrupt your life.    Getting better  You may believe that nothing can help you. Or, you might fear what others may think. But most anxiety symptoms can be eased. Having an anxiety disorder is nothing to be ashamed of. Most people do best with treatment that combines medicine and individual and group therapy. These aren t cures. But they can help you live a healthier life.  Date Last Reviewed: 2/1/2017 2000-2019 [employer]. [address] [place] [address], [place] [address]. All rights reserved. This information is not intended as a substitute for professional medical care. Always follow your healthcare professional's instructions.

## 2020-05-08 NOTE — PROGRESS NOTES
"Cristy Eubanks is a 29 year old female who is being evaluated via a billable video visit.      The patient has been notified of following:     \"This video visit will be conducted via a call between you and your physician/provider. We have found that certain health care needs can be provided without the need for an in-person physical exam.  This service lets us provide the care you need with a video conversation.  If a prescription is necessary we can send it directly to your pharmacy.  If lab work is needed we can place an order for that and you can then stop by our lab to have the test done at a later time.    Video visits are billed at different rates depending on your insurance coverage.  Please reach out to your insurance provider with any questions.    If during the course of the call the physician/provider feels a video visit is not appropriate, you will not be charged for this service.\"    Patient has given verbal consent for Video visit? Yes    How would you like to obtain your AVS? Mail copy    Patient would like the video invitation sent by: Text to cell phone: [phone]     Will anyone else be joining your video visit? No      Subjective     Cristy Eubanks is a 29 year old female who presents to clinic today for the following health issues:    This was a scheduled as a telephone visit instead of office visit due to coronavirus pandemic.    HPI  Patient feels very depressed.  She has past history of depression and seasonal affective disorder.  She used to take Prozac and Wellbutrin  She  quit taking it in November 2019.  She is having anxiety and panic attack.  She has used lorazepam in the past.  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  Having difficulty concentrating  Per patient most likely this isolation and lockdown is affecting her  She tried to go to work and have some interaction but she worked in place which is for COVID " suspected people  So did not have that much interaction.  She has a counselor whom she can communicate  Does not need any referral  But feels very depressed  Denies any suicidal ideation or any plans to hurt herself  She is going to seek out in case of emergency situation.      Video Start Time: 8:57 AM    It was scheduled as [employer] but it did not work.  So we got connected via [employer]     Patient Active Problem List   Diagnosis     Seasonal affective disorder (H)     CARDIOVASCULAR SCREENING; LDL GOAL LESS THAN 160     Depression with anxiety     Acute pain of right shoulder     Family history of breast cancer     Presence of intrauterine contraceptive device     Past Surgical History:   Procedure Laterality Date     APPENDECTOMY  1997       Social History     Tobacco Use     Smoking status: Never Smoker     Smokeless tobacco: Never Used   Substance Use Topics     Alcohol use: Yes     Alcohol/week: 0.0 standard drinks     Comment: 1 x per month      Family History   Problem Relation Age of Onset     Hypertension Mother      Breast Cancer Maternal Grandmother      Cancer Maternal Grandmother      Breast Cancer Paternal Grandmother      Cancer Paternal Grandmother      Diabetes No family hx of      Thyroid Disease No family hx of      Glaucoma No family hx of      Macular Degeneration No family hx of          Current Outpatient Medications   Medication Sig Dispense Refill     buPROPion (WELLBUTRIN XL) 150 MG 24 hr tablet Take 1 tablet (150 mg) by mouth every morning 90 tablet 1     FLUoxetine (PROZAC) 20 MG capsule Take one capsule a day for 7 days then go to 2 capsules a day as maintenance 60 capsule 1     levonorgestrel (MIRENA, 52 MG,) 20 MCG/24HR IUD 1 each (20 mcg) by Intrauterine route once for 1 dose 1 each 0     LORazepam (ATIVAN) 0.5 MG tablet Take 1 tablet (0.5 mg) by mouth 2 times daily as needed for anxiety 15 tablet 1       Reviewed and updated as needed this visit by Provider         Review of Systems  "  ROS COMP: Constitutional, HEENT, cardiovascular, pulmonary, gi and gu systems are negative, except as otherwise noted.      Objective    There were no vitals taken for this visit.  Estimated body mass index is 23.57 kg/m  as calculated from the following:    Height as of 4/29/20: 1.676 m (5' 6\").    Weight as of 4/29/20: 66.2 kg (146 lb).  Physical Exam     GENERAL: healthy, alert and no distress  Almost crying and appears very depressed  EYES: Eyes grossly normal to inspection, conjunctivae and sclerae normal  RESP: no audible wheeze, cough, or visible cyanosis.  No visible retractions or increased work of breathing.  Able to speak fully in complete sentences.  NEURO: Cranial nerves grossly intact, mentation intact and speech normal but she appeared depressed  PSYCH: feels depressed and had tears in her eyes              Assessment & Plan     Cristy was seen today for depression and anxiety.    Diagnoses and all orders for this visit:    Depression with anxiety  -     FLUoxetine (PROZAC) 20 MG capsule; Take one capsule a day for 7 days then go to 2 capsules a day as maintenance  -     buPROPion (WELLBUTRIN XL) 150 MG 24 hr tablet; Take 1 tablet (150 mg) by mouth every morning  She feels very depressed   Having anxiety and panic attack.  She used to take above medication in the past   She quite taking it on 11/2019  Due to current situation she feels depressed   Works for [employer]  Off of work till next Friday .  Denies any suicidal ideation or plans to hurt herself.  Has counselor and will reach out   Will seek attention in case of worsening symptoms.  She requested lorazepam which she used in the past .   Lorazepam can be habit-forming. It should be taken as prescribed. Do not mix it  with alcohol. Be careful with driving.Do not loose the  Prescription.  Do not overuse this medication. It is a controlled substance.  Lorazepam is only for short-term use until other medication kicks in  I offered referral to " counselor but she already has information and has her own counselor and she is going to reach out  She will also go for a walk and will get exposed to some sunlight  Daylight is very helpful  She will speak to her mother to have some conversations so that will help her anxiety and depression  Will do another follow-up on Friday  Discussed with her about getting immediate help in case of worsening of symptoms and she is in agreement  She already has information    Seasonal affective disorder (H)  -     FLUoxetine (PROZAC) 20 MG capsule; Take one capsule a day for 7 days then go to 2 capsules a day as maintenance  -     buPROPion (WELLBUTRIN XL) 150 MG 24 hr tablet; Take 1 tablet (150 mg) by mouth every morning    Panic attack  -     LORazepam (ATIVAN) 0.5 MG tablet; Take 1 tablet (0.5 mg) by mouth 2 times daily as needed for anxiety    Disclaimer: This note consists of symbols derived from keyboarding, dictation and/or voice recognition software. As a result, there may be errors in the script that have gone undetected. Please consider this when interpreting information found in this chart.         See Patient Instructions    Return in about 1 week (around 5/15/2020) for Anxiety, Depression.    Abby Vinson MD  [facility], [place]      Video-Visit Details    Type of service:  Video Visit    Video End Time:9:10  AM    Originating Location (pt. Location): Home    Distant Location (provider location):  [facility], [place]     Platform used for Video Visit: [employer]    Return in about 1 week (around 5/15/2020) for Anxiety, Depression.       Abby Vinson MD

## 2020-05-10 LAB
C TRACH DNA SPEC QL NAA+PROBE: NEGATIVE
N GONORRHOEA DNA SPEC QL NAA+PROBE: NEGATIVE
SPECIMEN SOURCE: NORMAL
SPECIMEN SOURCE: NORMAL

## 2020-05-10 NOTE — RESULT ENCOUNTER NOTE
Please inform patient of negative GC results. Patient to call back if she as any questions or concerns.    JORGE Salamanca, CNM

## 2020-05-14 ENCOUNTER — VIRTUAL VISIT (OUTPATIENT)
Dept: FAMILY MEDICINE | Facility: CLINIC | Age: 30
End: 2020-05-14
Payer: COMMERCIAL

## 2020-05-14 DIAGNOSIS — F41.8 DEPRESSION WITH ANXIETY: Primary | ICD-10-CM

## 2020-05-14 DIAGNOSIS — F33.8 SEASONAL AFFECTIVE DISORDER (H): ICD-10-CM

## 2020-05-14 PROCEDURE — 99213 OFFICE O/P EST LOW 20 MIN: CPT | Mod: GT | Performed by: INTERNAL MEDICINE

## 2020-05-14 PROCEDURE — 96127 BRIEF EMOTIONAL/BEHAV ASSMT: CPT | Mod: GT | Performed by: INTERNAL MEDICINE

## 2020-05-14 ASSESSMENT — PATIENT HEALTH QUESTIONNAIRE - PHQ9: SUM OF ALL RESPONSES TO PHQ QUESTIONS 1-9: 22

## 2020-05-14 NOTE — PATIENT INSTRUCTIONS
Continue present treatment.  Follow up in one month  Try to go back to work tomorrow.  See how it goes  Follow up in one month  Seek sooner medical attention if there is any worsening of symptoms or problems.

## 2020-05-14 NOTE — PROGRESS NOTES
"Cristy Eubanks is a 29 year old female who is being evaluated via a billable video visit.      The patient has been notified of following:     \"This video visit will be conducted via a call between you and your physician/provider. We have found that certain health care needs can be provided without the need for an in-person physical exam.  This service lets us provide the care you need with a video conversation.  If a prescription is necessary we can send it directly to your pharmacy.  If lab work is needed we can place an order for that and you can then stop by our lab to have the test done at a later time.    Video visits are billed at different rates depending on your insurance coverage.  Please reach out to your insurance provider with any questions.    If during the course of the call the physician/provider feels a video visit is not appropriate, you will not be charged for this service.\"    Patient has given verbal consent for Video visit? Yes    How would you like to obtain your AVS? Marli    Patient would like the video invitation sent by: Text to cell phone: [phone]    Will anyone else be joining your video visit? No    Subjective     Cristy Eubanks is a 29 year old female who presents today via video visit for the following health issues:    \[facility]\""        Video Start Time: 8:34 AM    Patient is still feeling depressed .  Spoke to her counselor   Having difficulty sleeping.  Scheduled to work tomorrow and on weekend.  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 and she needs to give sometimes for medications to but kick in    Patient Active Problem List   Diagnosis     Seasonal affective disorder (H)     CARDIOVASCULAR SCREENING; LDL GOAL LESS THAN 160     Depression with anxiety     Acute pain of right shoulder     Family history of breast cancer     Presence of intrauterine contraceptive device     Past Surgical History:   Procedure Laterality Date     APPENDECTOMY  1997     " "  Social History     Tobacco Use     Smoking status: Never Smoker     Smokeless tobacco: Never Used   Substance Use Topics     Alcohol use: Yes     Alcohol/week: 0.0 standard drinks     Comment: 1 x per month      Family History   Problem Relation Age of Onset     Hypertension Mother      Breast Cancer Maternal Grandmother      Cancer Maternal Grandmother      Breast Cancer Paternal Grandmother      Cancer Paternal Grandmother      Diabetes No family hx of      Thyroid Disease No family hx of      Glaucoma No family hx of      Macular Degeneration No family hx of          Current Outpatient Medications   Medication Sig Dispense Refill     buPROPion (WELLBUTRIN XL) 150 MG 24 hr tablet Take 1 tablet (150 mg) by mouth every morning 90 tablet 1     FLUoxetine (PROZAC) 20 MG capsule Take one capsule a day for 7 days then go to 2 capsules a day as maintenance 60 capsule 1     levonorgestrel (MIRENA, 52 MG,) 20 MCG/24HR IUD 1 each (20 mcg) by Intrauterine route once for 1 dose 1 each 0     LORazepam (ATIVAN) 0.5 MG tablet Take 1 tablet (0.5 mg) by mouth 2 times daily as needed for anxiety (Patient not taking: Reported on 5/14/2020) 15 tablet 1       Reviewed and updated as needed this visit by Provider         Review of Systems   Constitutional, HEENT, cardiovascular, pulmonary, gi and gu systems are negative, except as otherwise noted.      Objective    There were no vitals taken for this visit.  Estimated body mass index is 23.57 kg/m  as calculated from the following:    Height as of 4/29/20: 1.676 m (5' 6\").    Weight as of 4/29/20: 66.2 kg (146 lb).  Physical Exam     GENERAL: Healthy, alert and no distress  Less depressed compared to last time affect was sad  EYES: Eyes grossly normal to inspection.  No discharge or erythema, or obvious scleral/conjunctival abnormalities.  RESP: No audible wheeze, cough, or visible cyanosis.  No visible retractions or increased work of breathing.    SKIN: Visible skin clear. No " significant rash, abnormal pigmentation or lesions.  NEURO: Cranial nerves grossly intact.  Mentation and speech appropriate for age.  PSYCH: She appeared depressed but was not tearful as last time. judgement and insight intact, normal speech and appearance well-groomed.              Assessment & Plan     Cristy was seen today for follow up.    Diagnoses and all orders for this visit:    Depression with anxiety   Continue present management  Take Wellbutrin in morning  Give this some time to work  Go back to work tomorrow see how it goes   Otherwise let us know if you face any problems.  Try small dose of lorazepam for sleep tonight so you can function better     Seasonal affective disorder (H)    See note above        See Patient Instructions  Patient Instructions   Continue present treatment.  Follow up in one month  Try to go back to work tomorrow.  See how it goes  Follow up in one month  Seek sooner medical attention if there is any worsening of symptoms or problems.        Return in about 1 month (around 6/14/2020) for Anxiety, medication follow up.    Abby Vinson MD  [facility]      Video-Visit Details    Type of service:  Video Visit    Video End Time:8:42 AM    Originating Location (pt. Location): Home    Distant Location (provider location):  [facility]     Platform used for Video Visit: Doximity    Return in about 1 month (around 6/14/2020) for Anxiety, medication follow up.       Abby Vinson MD

## 2020-12-18 ENCOUNTER — THERAPY VISIT (OUTPATIENT)
Dept: PHYSICAL THERAPY | Facility: CLINIC | Age: 30
End: 2020-12-18
Payer: COMMERCIAL

## 2020-12-18 DIAGNOSIS — M25.551 HIP PAIN, RIGHT: ICD-10-CM

## 2020-12-18 PROCEDURE — 97110 THERAPEUTIC EXERCISES: CPT | Mod: GP | Performed by: PHYSICAL THERAPIST

## 2020-12-18 PROCEDURE — 97161 PT EVAL LOW COMPLEX 20 MIN: CPT | Mod: GP | Performed by: PHYSICAL THERAPIST

## 2020-12-18 PROCEDURE — 97530 THERAPEUTIC ACTIVITIES: CPT | Mod: GP | Performed by: PHYSICAL THERAPIST

## 2020-12-18 ASSESSMENT — ACTIVITIES OF DAILY LIVING (ADL)
GOING_UP_1_FLIGHT_OF_STAIRS: SLIGHT DIFFICULTY
HOS_ADL_SCORE(%): 77.94
ROLLING_OVER_IN_BED: NO DIFFICULTY AT ALL
SITTING_FOR_15_MINUTES: MODERATE DIFFICULTY
GETTING_INTO_AND_OUT_OF_AN_AVERAGE_CAR: MODERATE DIFFICULTY
HOS_ADL_HIGHEST_POTENTIAL_SCORE: 68
HEAVY_WORK: SLIGHT DIFFICULTY
HOS_ADL_ITEM_SCORE_TOTAL: 53
DEEP_SQUATTING: SLIGHT DIFFICULTY
RECREATIONAL_ACTIVITIES: MODERATE DIFFICULTY
TWISTING/PIVOTING_ON_INVOLVED_LEG: SLIGHT DIFFICULTY
HOS_ADL_COUNT: 17
STEPPING_UP_AND_DOWN_CURBS: NO DIFFICULTY AT ALL
HOW_WOULD_YOU_RATE_YOUR_CURRENT_LEVEL_OF_FUNCTION_DURING_YOUR_USUAL_ACTIVITIES_OF_DAILY_LIVING_FROM_0_TO_100_WITH_100_BEING_YOUR_LEVEL_OF_FUNCTION_PRIOR_TO_YOUR_HIP_PROBLEM_AND_0_BEING_THE_INABILITY_TO_PERFORM_ANY_OF_YOUR_USUAL_DAILY_ACTIVITIES?: 75
PUTTING_ON_SOCKS_AND_SHOES: SLIGHT DIFFICULTY
GETTING_INTO_AND_OUT_OF_A_BATHTUB: SLIGHT DIFFICULTY
WALKING_INITIALLY: MODERATE DIFFICULTY
WALKING_APPROXIMATELY_10_MINUTES: SLIGHT DIFFICULTY
GOING_DOWN_1_FLIGHT_OF_STAIRS: SLIGHT DIFFICULTY
WALKING_DOWN_STEEP_HILLS: NO DIFFICULTY AT ALL
WALKING_UP_STEEP_HILLS: NO DIFFICULTY AT ALL
LIGHT_TO_MODERATE_WORK: SLIGHT DIFFICULTY
STANDING_FOR_15_MINUTES: NO DIFFICULTY AT ALL
WALKING_15_MINUTES_OR_GREATER: SLIGHT DIFFICULTY

## 2020-12-18 NOTE — PROGRESS NOTES
[facility] Initial Evaluation    Physical Therapy Initial Examination/Evaluation  December 18, 2020    Cristy Eubanks  is a 30 year old  female referred to physical therapy as a self referral for treatment of R hip pain.      DOI/onset 12-5-20  Mechanism of injury Patient had an onset of R hip pain On 12-5 a few weeks after starting a running program for the first time in several years. She denies and specific incident while running.  DOS n/a  Prior treatment none.     Chief Complaint:   Inability to run due to pain.   Pain location: R greater trochanter  Quality: sharp  Constant/Intermittent: intermittent  Time of day: no time pattern  Symptoms have improved some since onset.    Current pain 3/10.  Pain at best 2/10.  Pain at worst 4/10.    Symptoms aggravated by first getting up to walk, running.    Symptoms improved with rest.       Occupation: RN.  Job duties:  Prolonged standing, lifting, carrying, pushing/pulling    Patient having difficulty with ADLs: none.    Patient's goals are to reduce pain and resume running.    Patient reports general health as fair.  Related medical history no hx of R hip issues.    Surgical History:  none.    Imaging: none.    Medications:  none.       Return to MD:  Self referral.      Clinical Impression: Patient should respond very well to continued PT intervention focusing on strengthening her R hip musculature. She presents with significant R hip weakness that led to her pain after several weeks of running.    Subjective:  HPI                    Objective:  Standing Alignment:          Pelvic:  Normal          Gait:    Gait Type:  Normal         Flexibility/Screens:   Positive screens:  Hip        Lower Extremity:  Normal                                               Hip Evaluation  Hip PROM:  Hip PROM:  Left Hip:    Normal  Right Hip:  Normal                          Hip Strength:    Flexion:   Right: 4/5   Pain:                    Extension:  Right:  4-/5    Pain:    Abduction:  Right: 4-/5    Pain:    Internal Rotation:  Right: 4-/5   Pain:  External Rotation:  Right: 4-/5   Pain:            Hip Special Testing:   Special tests hip not assessed: Scour test (-) R.    Right hip negative for the following special tests:  Florentino or Fadir/Labrum    Hip Palpation:      Right hip tenderness present at:  Greater Trachanter and Gluteus Medius  Functional Testing:          Quad:    Single leg squat:   Left:    Right:   Moderate loss of control                         General Evaluation:          Lower Extremity Flexibility:  normal                                                                             ROS    Assessment/Plan:    Patient is a 30 year old female with right side hip complaints.    Patient has the following significant findings with corresponding treatment plan.                Diagnosis 1:  R hip pain  Pain -  self management and education  Decreased strength - therapeutic exercise and therapeutic activities  Impaired muscle performance - neuro re-education  Decreased function - therapeutic activities    Therapy Evaluation Codes:   1) History comprised of:   Personal factors that impact the plan of care:      None.    Comorbidity factors that impact the plan of care are:      None.     Medications impacting care: None.  2) Examination of Body Systems comprised of:   Body structures and functions that impact the plan of care:      Hip.   Activity limitations that impact the plan of care are:      Running and Walking.  3) Clinical presentation characteristics are:   Stable/Uncomplicated.  4) Decision-Making    Low complexity using standardized patient assessment instrument and/or measureable assessment of functional outcome.  Cumulative Therapy Evaluation is: Low complexity.    Previous and current functional limitations:  (See Goal Flow Sheet for this information)    Short term and Long term goals: (See Goal Flow Sheet for this information)      Communication ability:  Patient appears to be able to clearly communicate and understand verbal and written communication and follow directions correctly.  Treatment Explanation - The following has been discussed with the patient:   RX ordered/plan of care  Anticipated outcomes  Possible risks and side effects  This patient would benefit from PT intervention to resume normal activities.   Rehab potential is good.    Frequency:  2 X month, once daily  Duration:  for 1 month  Discharge Plan:  Achieve all LTG.  Independent in home treatment program.  Reach maximal therapeutic benefit.    Please refer to the daily flowsheet for treatment today, total treatment time and time spent performing 1:1 timed codes.

## 2020-12-18 NOTE — PROGRESS NOTES
[facility] Initial Evaluation  Subjective:    Patient Health History             Pertinent medical history includes: depression.     Medical allergies: NKA.   Surgeries include:  Other (Appendectomy).    Current medications:  Anti-depressants.    Current occupation is RN.   Primary job tasks include:  Lifting/carrying, pushing/pulling and prolonged standing.                                    Objective:  System    Physical Exam    General     ROS    Assessment/Plan:

## 2020-12-30 ENCOUNTER — THERAPY VISIT (OUTPATIENT)
Dept: PHYSICAL THERAPY | Facility: CLINIC | Age: 30
End: 2020-12-30
Payer: COMMERCIAL

## 2020-12-30 DIAGNOSIS — M25.551 HIP PAIN, RIGHT: ICD-10-CM

## 2020-12-30 PROCEDURE — 97530 THERAPEUTIC ACTIVITIES: CPT | Mod: GP | Performed by: PHYSICAL THERAPIST

## 2020-12-30 PROCEDURE — 97110 THERAPEUTIC EXERCISES: CPT | Mod: GP | Performed by: PHYSICAL THERAPIST

## 2020-12-31 NOTE — PROGRESS NOTES
Subjective:  HPI  Physical Exam                    Objective:  System     Physical Exam    General     ROS    Assessment/Plan:    DISCHARGE REPORT    Progress reporting period is from 12-18-20 to 12-30-20.       SUBJECTIVE  Subjective changes noted by patient:   Pt. has made good progress in ashort 2 week stint of PT. She reports less R hip pain and feels she is getting stronger. She has not resumed running yet and will wait several more weeks before doing so. She will continue her HEP with no further PT visits planned unless increased problems arise.      Current pain level is  3/10.     Previous pain level was  4/10.   Changes in function:  Yes (See Goal flowsheet attached for changes in current functional level)  Adverse reaction to treatment or activity: None    OBJECTIVE  Changes noted in objective findings: Strength R hip flex 4+/5; abd 4-/5; ext 4+/5; glut med 4/5     ASSESSMENT/PLAN  Updated problem list and treatment plan: Diagnosis 1:  R hip pain  Pain -  self management and education  Decreased strength - therapeutic exercise and therapeutic activities  Impaired muscle performance - neuro re-education  Decreased function - therapeutic activities  STG/LTGs have been met or progress has been made towards goals:  Yes (See Goal flow sheet completed today.)  Assessment of Progress: Patient is meeting short term goals and is progressing towards long term goals.  Self Management Plans:  Patient is independent in a home treatment program.  Patient is independent in self management of symptoms.  I have re-evaluated this patient and find that the nature, scope, duration and intensity of the therapy is appropriate for the medical condition of the patient.  Cristy continues to require the following intervention to meet STG and LTG's:  PT intervention is no longer required to meet STG/LTG.    Recommendations:  This patient is ready to be discharged from therapy and continue their home treatment program.    Please refer  to the daily flowsheet for treatment today, total treatment time and time spent performing 1:1 timed codes.

## 2021-10-01 DIAGNOSIS — F41.8 DEPRESSION WITH ANXIETY: ICD-10-CM

## 2021-10-01 DIAGNOSIS — F33.8 SEASONAL AFFECTIVE DISORDER (H): ICD-10-CM

## 2021-10-01 NOTE — TELEPHONE ENCOUNTER
Routing refill request to provider for review/approval because:   PHQ-9 score less than 5 in past 6 months Protocol Details    Recent (6 mo) or future (30 days) visit within the authorizing provider's specialty        LORNE Shah, RN  [facility]

## 2021-11-16 PROBLEM — M25.551 HIP PAIN, RIGHT: Status: RESOLVED | Noted: 2020-12-18 | Resolved: 2021-11-16

## 2022-03-11 NOTE — PROGRESS NOTES
"  IUD Removal:  SUBJECTIVE:    Is a pregnancy test required: {Pregnancy test required:178425}  Was a consent obtained?  {Yes/No:218741::\"Yes\"}    Cristy Eubanks is a 31 year old female,, No LMP recorded. (Menstrual status: IUD). who presents today for IUD removal. Her current IUD was placed 4 years ago. She {PLC has had not had:285460} with the IUD. She requests removal of the IUD because {PLC Removal:895678}    Today's PHQ-2 Score:   PHQ-2 (  [employer]) 3/9/2020   Q1: Little interest or pleasure in doing things 0   Q2: Feeling down, depressed or hopeless 0   PHQ-2 Score 0   PHQ-2 Total Score (12-17 Years)- Positive if 3 or more points; Administer PHQ-A if positive 0       PROCEDURE:    A speculum exam was performed and the cervix was visualized. The IUD string {WAS/WAS NOT:9033::\"was\"} visualized. Using ring forceps, the string  was grasped and the IUD removed intact.    POST PROCEDURE:    The patient {PLC Tolerance:337885}. Patient was discharged in stable condition.    {PLC IUD REMOVAL INST:350768::\"Call if bleeding, pain or fever occur.\",\"Birth control counseling given.\"}    JORGE Garcia  "

## 2022-03-20 ENCOUNTER — HEALTH MAINTENANCE LETTER (OUTPATIENT)
Age: 32
End: 2022-03-20

## 2022-03-21 ENCOUNTER — OFFICE VISIT (OUTPATIENT)
Dept: MIDWIFE SERVICES | Facility: CLINIC | Age: 32
End: 2022-03-21
Payer: COMMERCIAL

## 2022-03-21 VITALS
SYSTOLIC BLOOD PRESSURE: 102 MMHG | DIASTOLIC BLOOD PRESSURE: 64 MMHG | BODY MASS INDEX: 27.8 KG/M2 | HEIGHT: 66 IN | WEIGHT: 173 LBS

## 2022-03-21 DIAGNOSIS — B37.31 CANDIDIASIS OF VULVA AND VAGINA: Primary | ICD-10-CM

## 2022-03-21 DIAGNOSIS — B37.31 YEAST VAGINITIS: ICD-10-CM

## 2022-03-21 PROCEDURE — 99213 OFFICE O/P EST LOW 20 MIN: CPT | Performed by: NURSE PRACTITIONER

## 2022-03-21 RX ORDER — NYSTATIN 100000 U/G
OINTMENT TOPICAL 2 TIMES DAILY
Qty: 15 G | Refills: 1 | Status: SHIPPED | OUTPATIENT
Start: 2022-03-21 | End: 2022-04-04

## 2022-03-21 RX ORDER — DIAPER,BRIEF,INFANT-TODD,DISP
EACH MISCELLANEOUS 2 TIMES DAILY
Qty: 30 G | Refills: 1 | Status: SHIPPED | OUTPATIENT
Start: 2022-03-21 | End: 2022-03-23

## 2022-03-21 RX ORDER — ESCITALOPRAM OXALATE 20 MG/1
TABLET ORAL
COMMUNITY
Start: 2022-03-10

## 2022-03-21 RX ORDER — FLUCONAZOLE 150 MG/1
150 TABLET ORAL
Qty: 2 TABLET | Refills: 1 | Status: SHIPPED | OUTPATIENT
Start: 2022-03-21 | End: 2022-04-06

## 2022-03-21 RX ORDER — LAMOTRIGINE 150 MG/1
TABLET ORAL
COMMUNITY
Start: 2022-03-10 | End: 2023-01-20

## 2022-03-21 RX ORDER — QUETIAPINE FUMARATE 100 MG/1
TABLET, FILM COATED ORAL
COMMUNITY
Start: 2021-07-09 | End: 2023-01-20

## 2022-03-21 NOTE — PROGRESS NOTES
SUBJECTIVE:                                                   Cristy Eubanks is a 31 year old who presents to clinic today for the following health issue(s):  Patient presents with:  Gyn Exam      HPI:  Cristy states that over the past 2 weeks she has had irritation and itching on the outside of her vagina and onto her vulva.  She states that she has had similar signs and symptoms before and has been diagnosed with vulvar candidiasis.     No LMP recorded. (Menstrual status: IUD).  Menstrual History: frequency: every 28-30 days  Patient is sexually active  .  Using IUD for contraception.   Health maintenance updated:  yes  STI infx testing offered:  Declined    Last PHQ-9 score on record =   PHQ-9 SCORE 2020   PHQ-9 Total Score -   PHQ-9 Total Score 22     Last GAD7 score on record =   GURPREET-7 SCORE 10/12/2017   Total Score 0     Problem list and histories reviewed & adjusted, as indicated.  Additional history: as documented.    Patient Active Problem List   Diagnosis     Seasonal affective disorder (H)     CARDIOVASCULAR SCREENING; LDL GOAL LESS THAN 160     Depression with anxiety     Acute pain of right shoulder     Family history of breast cancer     Presence of intrauterine contraceptive device     Past Surgical History:   Procedure Laterality Date     APPENDECTOMY        Social History     Tobacco Use     Smoking status: Never Smoker     Smokeless tobacco: Never Used   Substance Use Topics     Alcohol use: Yes     Alcohol/week: 0.0 standard drinks     Comment: 1 x per month       Problem (# of Occurrences) Relation (Name,Age of Onset)    Breast Cancer (2) Maternal Grandmother, Paternal Grandmother    Cancer (2) Maternal Grandmother, Paternal Grandmother    Hypertension (1) Mother       Negative family history of: Diabetes, Thyroid Disease, Glaucoma, Macular Degeneration            Current Outpatient Medications   Medication Sig     escitalopram (LEXAPRO) 20 MG tablet      fluconazole (DIFLUCAN)  "150 MG tablet Take 1 tablet (150 mg) by mouth every 3 days for 6 doses Take one tablet now, repeat dose after 3 days     hydrocortisone (CORTAID) 0.5 % external ointment Apply topically 2 times daily for 14 days     lamoTRIgine (LAMICTAL) 150 MG tablet      levonorgestrel (MIRENA, 52 MG,) 20 MCG/24HR IUD 1 each (20 mcg) by Intrauterine route once for 1 dose     nystatin (MYCOSTATIN) 129049 UNIT/GM external ointment Apply topically 2 times daily for 14 days     QUEtiapine (SEROQUEL) 100 MG tablet  (Patient not taking: Reported on 3/21/2022)     No current facility-administered medications for this visit.     Allergies   Allergen Reactions     No Known Allergies        ROS:  12 point review of systems negative other than symptoms noted below or in the HPI.  Genitourinary: Vaginal Itching  12 point review of systems negative other than symptoms noted below.    OBJECTIVE:     /64   Ht 1.676 m (5' 6\")   Wt 78.5 kg (173 lb)   BMI 27.92 kg/m    Body mass index is 27.92 kg/m .    PHYSICAL EXAM:  Constitutional:  Appearance: Well nourished, well developed alert, in no acute distress  Skin: General Inspection:  No rashes present, no lesions present, no areas of discoloration.  Redness, erythema right vulva into labia majora, scratch marks present, appears similar to yeast  Neurologic:  Mental Status:  Oriented X3.  Normal strength and tone, sensory exam grossly normal, mentation intact and speech normal.    Psychiatric:  Mentation appears normal and affect normal/bright.  No Pelvic Exam performed     In-Clinic Test Results:  No results found for this or any previous visit (from the past 24 hour(s)).    ASSESSMENT/PLAN:                                                        ICD-10-CM    1. Candidiasis of vulva and vagina  B37.3    2. Yeast vaginitis  B37.3 fluconazole (DIFLUCAN) 150 MG tablet     nystatin (MYCOSTATIN) 953661 UNIT/GM external ointment     hydrocortisone (CORTAID) 0.5 % external ointment "       COUNSELING:  -counseled on yeast vaginitis/vulvar candidiasis treatment/prevention  -declined cultures/testing; will consider testing if yeast infection does not improve with prescriptions  -return to clinic PRN       15 minutes spent on the date of the encounter doing chart review, patient visit and documentation     Yee PATEL, TOMMY, [facility]  [phone]

## 2022-03-21 NOTE — PATIENT INSTRUCTIONS
Vaginitis (Vaginal Irritation/Infection)    Vaginitis is very common!  The most common vaginal infections are bacterial vaginosis or yeast. These infections are not sexually transmitted but can be incredibly uncomfortable. Seek care from your midwife if signs or symptoms arise.     Normal vaginal discharge:      Is white, clear, thick or thin (it may change depending on where you are in your cycle)    Does not have a foul odor    The amount of discharge varies    Abnormal discharge/symptoms:       Itching in and around the vagina    Redness, pain or swelling    Discharge that is foamy, greenish, curd like, or bloody    Foul smelling odor    Pain when urinating or having sex    Fever    Causes of vaginal infections:      Good bacteria from the vagina have been destroyed by bad bacteria    Reaction to something in the vagina such as a tampon or scented/perfumed soaps or bubble bath    STI's    Sensitivities to soaps/detergents/dryer sheets, lubricants, etc.    Hormonal changes    Recent use of antibiotics     Infections can also occur after you've had intercourse with a new partner or if you have had frequent intercourse         Here is a list of suggestions that may help prevent/treat vaginal infections and will help maintain a healthy vaginal environment:      1.  Boosting your immune system so you can heal faster      Make sure you are getting adequate sleep    Drink 2-3 quarts of fluids per day, Cranberries or cranberry juice (unsweetened)    Eat more nuts, grains, raw veggies, yogurt, elva, grapefruit    Decrease intake of refined sugar, red meat and alcohol    Echinacea - 3 times a day for chronic problem or every 2 hours for acute symptoms; use as directed on bottle          2.  Changing the vaginal environment to a more acid state       Soak in a warm bath tub with one cup of vinegar or lemon juice. Do not use scented soap, bubble bath, or oils.       3.  Increasing the good healthy bacteria      At each  meal drink 1 tsp apple cider vinegar and 1 tsp honey in   cup warm water    Eat garlic daily, capsules or fresh.      Take probiotics 4-8 billion units/day      4.  Preventive measures      Wear cotton underwear, no thongs.  Do not wear tight clothes or pantyhose    Shower soon after working or change out of sweaty clothing     Do not wear underwear to bed.  The vaginal environment needs to breathe    Never douche or use vaginal , the vagina is self-cleaning!    Use white, unscented toilet paper.  Do not use baby wipes.  Wipe from front to back    Use only unscented tampons and pads, buy organic products if desired    Do not use perfumes/oils/lotions near your vagina or take bubble baths    Use only mild, unscented soaps around your vaginal area     Do not use fabric softeners/dryer sheets    Use gentle, unscented detergent, consider buying non-petroleum based detergents    Use only water based lubricants during sexual contact    Abstain from intercourse during times of infection      If your symptoms do not resolve or if you have questions please call:     [facility]   [phone]

## 2022-03-22 ENCOUNTER — TELEPHONE (OUTPATIENT)
Dept: OBGYN | Facility: CLINIC | Age: 32
End: 2022-03-22
Payer: COMMERCIAL

## 2022-03-22 NOTE — TELEPHONE ENCOUNTER
Hello, we received rx for hydrocortisone 0.5% ointment and unfortunately, product is unavailable at this time. Please select alternative. We are able to get 0.5% cream or 1% ointment.     Thank you!   Tamra Smith, PharmD, [facility]  Pharmacy Specialist  [facility]  [phone]

## 2022-03-23 DIAGNOSIS — B37.31 CANDIDIASIS OF VULVA AND VAGINA: Primary | ICD-10-CM

## 2022-03-23 DIAGNOSIS — B37.31 YEAST VAGINITIS: ICD-10-CM

## 2022-03-23 RX ORDER — DIAPER,BRIEF,INFANT-TODD,DISP
EACH MISCELLANEOUS 2 TIMES DAILY
Qty: 30 G | Refills: 1 | Status: SHIPPED | OUTPATIENT
Start: 2022-03-23 | End: 2022-04-06

## 2022-05-20 ENCOUNTER — TELEPHONE (OUTPATIENT)
Dept: BEHAVIORAL HEALTH | Facility: CLINIC | Age: 32
End: 2022-05-20
Payer: COMMERCIAL

## 2022-05-20 NOTE — TELEPHONE ENCOUNTER
Left a VM to remind pt about their Tuesday 5/24/22 video appt at 2 pm.     Writer mentioned, pt will need to connect virtual/video appt via My Chart and finish e-check in question 15-30 min prior to appt time.

## 2022-05-24 ENCOUNTER — VIRTUAL VISIT (OUTPATIENT)
Dept: PSYCHOLOGY | Facility: CLINIC | Age: 32
End: 2022-05-24
Attending: INTERNAL MEDICINE
Payer: COMMERCIAL

## 2022-05-24 DIAGNOSIS — R41.840 DIFFICULTY CONCENTRATING: ICD-10-CM

## 2022-05-24 PROCEDURE — 90839 PSYTX CRISIS INITIAL 60 MIN: CPT | Mod: 95 | Performed by: PSYCHOLOGIST

## 2022-05-24 ASSESSMENT — PATIENT HEALTH QUESTIONNAIRE - PHQ9
SUM OF ALL RESPONSES TO PHQ QUESTIONS 1-9: 14
10. IF YOU CHECKED OFF ANY PROBLEMS, HOW DIFFICULT HAVE THESE PROBLEMS MADE IT FOR YOU TO DO YOUR WORK, TAKE CARE OF THINGS AT HOME, OR GET ALONG WITH OTHER PEOPLE: NOT DIFFICULT AT ALL
SUM OF ALL RESPONSES TO PHQ QUESTIONS 1-9: 14

## 2022-05-24 ASSESSMENT — ANXIETY QUESTIONNAIRES
2. NOT BEING ABLE TO STOP OR CONTROL WORRYING: MORE THAN HALF THE DAYS
GAD7 TOTAL SCORE: 11
4. TROUBLE RELAXING: SEVERAL DAYS
GAD7 TOTAL SCORE: 11
7. FEELING AFRAID AS IF SOMETHING AWFUL MIGHT HAPPEN: NOT AT ALL
6. BECOMING EASILY ANNOYED OR IRRITABLE: MORE THAN HALF THE DAYS
GAD7 TOTAL SCORE: 11
7. FEELING AFRAID AS IF SOMETHING AWFUL MIGHT HAPPEN: NOT AT ALL
1. FEELING NERVOUS, ANXIOUS, OR ON EDGE: NEARLY EVERY DAY
8. IF YOU CHECKED OFF ANY PROBLEMS, HOW DIFFICULT HAVE THESE MADE IT FOR YOU TO DO YOUR WORK, TAKE CARE OF THINGS AT HOME, OR GET ALONG WITH OTHER PEOPLE?: SOMEWHAT DIFFICULT
GAD7 TOTAL SCORE: 11
7. FEELING AFRAID AS IF SOMETHING AWFUL MIGHT HAPPEN: NOT AT ALL
5. BEING SO RESTLESS THAT IT IS HARD TO SIT STILL: SEVERAL DAYS
3. WORRYING TOO MUCH ABOUT DIFFERENT THINGS: MORE THAN HALF THE DAYS
3. WORRYING TOO MUCH ABOUT DIFFERENT THINGS: MORE THAN HALF THE DAYS
7. FEELING AFRAID AS IF SOMETHING AWFUL MIGHT HAPPEN: NOT AT ALL
GAD7 TOTAL SCORE: 11
4. TROUBLE RELAXING: SEVERAL DAYS
2. NOT BEING ABLE TO STOP OR CONTROL WORRYING: MORE THAN HALF THE DAYS
1. FEELING NERVOUS, ANXIOUS, OR ON EDGE: NEARLY EVERY DAY
GAD7 TOTAL SCORE: 11
5. BEING SO RESTLESS THAT IT IS HARD TO SIT STILL: SEVERAL DAYS
8. IF YOU CHECKED OFF ANY PROBLEMS, HOW DIFFICULT HAVE THESE MADE IT FOR YOU TO DO YOUR WORK, TAKE CARE OF THINGS AT HOME, OR GET ALONG WITH OTHER PEOPLE?: SOMEWHAT DIFFICULT
6. BECOMING EASILY ANNOYED OR IRRITABLE: MORE THAN HALF THE DAYS

## 2022-05-24 NOTE — PROGRESS NOTES
[facility]   Mental Health & Addiction Services     Progress Note - Initial Visit    Patient  Name:  Cristy Eubanks  Date: 22         Service Type: Individual     Visit Start Time: 2:06pm Visit End Time: 2:59pm    Visit #: 1 53 minutes    Attendees: Client attended alone    Service Modality:  Video Visit:      Provider verified identity through the following two step process.  Patient provided:  Patient photo and Patient     Telemedicine Visit: The patient's condition can be safely assessed and treated via synchronous audio and visual telemedicine encounter.      Reason for Telemedicine Visit: Services only offered telehealth    Originating Site (Patient Location): Patient's home    Distant Site (Provider Location): Provider Remote Setting- Home Office    Consent:  The patient/guardian has verbally consented to: the potential risks and benefits of telemedicine (video visit) versus in person care; bill my insurance or make self-payment for services provided; and responsibility for payment of non-covered services.     Patient would like the video invitation sent by:  Send to e-mail at: [email or web address]    Mode of Communication:  Video Conference via well    As the provider I attest to compliance with applicable laws and regulations related to telemedicine.       DATA:   Interactive Complexity: No   Crisis: No     Presenting Concerns/  Current Stressors:   ADHD assessment; increased anxiety and depression      ASSESSMENT:  Mental Status Assessment:  Appearance:   Appropriate   Eye Contact:   Fair   Psychomotor Behavior: Normal   Attitude:   Cooperative  Pleasant  Orientation:   All  Speech   Rate / Production: Talkative   Volume:  Normal   Mood:    Anxious   Affect:    Appropriate   Thought Content:  Clear   Thought Form:  Coherent   Insight:    Good  and Fair       Safety Issues and Plan for Safety and Risk Management:     [place] Suicide Severity Rating Scale  "(Lifetime/Recent)  [place] Suicide Severity Rating (Lifetime/Recent) 5/24/2022   1. Wish to be Dead (Lifetime) 1   2. Non-Specific Active Suicidal Thoughts (Lifetime) 1   Non-Specific Active Suicidal Thought Description (Lifetime) MRE: 2 yrs ago: \"I just wanted to be done (with major depression and anxiety) because I was causing pain and suffering to everyone.\"   2. Non-Specific Active Suicidal Thoughts (Past 1 Month) 0   3. Active Suicidal Ideation with any Methods (Not Plan) Without Intent to Act (Lifetime) 1   Active Suicidal Ideation with any Methods (Not Plan) Description (Lifetime) \"Use a gun in the bathtub, I had a date set.\"   3. Active Suicidal Ideation with any Methods (Not Plan) Without Intent to Act (Past 1 Month) 0   4. Active Suicidal Ideation with Some Intent to Act, Without Specific Plan (Lifetime) 0   5. Active Suicidal Ideation with Specific Plan and Intent (Lifetime) 1   Active Suicidal Ideation with Specific Plan and Intent Description (Lifetime) \"I just wanted to be done (with major depression and anxiety) because I was causing pain and suffering to everyone.\"   5. Active Suicidal Ideation with Specific Plan and Intent (Past 1 Month) 0   Most Severe Ideation Rating (Lifetime) 2   Description of Most Severe Ideation (Lifetime) \"I just wanted to be done (with major depression and anxiety) because I was causing pain and suffering to everyone.\"   Frequency (Lifetime) 1   Duration (Lifetime) 1   Controllability (Lifetime) 3   Deterrents (Lifetime) 1   Deterrents (Past 1 Month) 0   Reasons for Ideation (Lifetime) 5   Reasons for Ideation (Past 1 Month) 0   Actual Attempt (Lifetime) 0   Has subject engaged in non-suicidal self-injurious behavior? (Lifetime) 0   Interrupted Attempts (Lifetime) 0   Aborted or Self-Interrupted Attempt (Lifetime) 0   Preparatory Acts or Behavior (Lifetime) 1   Total Number of Preparatory Acts (Lifetime) 1   Preparatory Acts or Behavior Description (Lifetime) Sat in tub " with a gun   Preparatory Acts or Behavior (Past 3 Months) 0   Calculated C-SSRS Risk Score (Lifetime/Recent) Moderate Risk     Patient denies current fears or concerns for personal safety.  Patient denies current or recent suicidal ideation or behaviors.  Patient denies current or recent homicidal ideation or behaviors.  Patient denies current or recent self injurious behavior or ideation.  Patient denies other safety concerns.  A safety and risk management plan has been developed including:   Moderate Risk is identified when the patient has one of the following:  Suicidal behavior more than three months ago ( C-SSRS Suicidal Behavior Lifetime)    The following has been recommended:  Complete/Review/Update Safety Plan    Safety Plan:  See below.  A copy was given to the patient.  Patient reports there are firearms in the house. The firearms are secured in a locked space.     Diagnostic Criteria:  Attention Deficit Hyperactivity Disorder  A) A persistent pattern of inattention and/or hyperactivity-impulsivity that interferes with functioning or development, as characterized by (1) Inattention and/or (2) Hyperactivity and Impulsivity  (1) Inattention: 6 or more of the following symptoms have persisted for at least 6 months to a degree that is inconsistent with developmental level and that negatively impacts directly on social and academic/occupational activities:  - Often fails to give close attention to details or makes careless mistakes in schoolwork, at work, or during other activities  - Often has difficulty sustaining attention in tasks or play activities  - Often has difficulty organizing tasks and activities  - Often loses things necessary for tasks or activities  - Is often easily distractedby extraneous stimuli  - Is often forgetful in daily activities  E) The Symptoms do not occur exclusively during the course of schizophrenia or another psychotic disorder and are not better explained by another mental  "disorder      DSM5 Diagnoses: (Sustained by DSM5 Criteria Listed Above)  Diagnoses: Attention-Deficit/Hyperactivity Disorder  314.01 (F90.2) Combined presentation (Provisional)  Psychosocial & Contextual Factors: Client is single and works full-time.  WHODAS 2.0 (12 item):   WHODAS 2.0 Total Score 5/24/2022   Total Score 40   Total Score MyChart 40     Intervention:   Psychoeducation; Skill review/identification & recommendation; Pattern recognition; Socratic Questioning; Active listening, validation, and other rapport building skills; Administer and explain screeners; Co-created Crisis Plan    Collateral Reports Completed:  Not Applicable      PLAN: (Homework, other):  1. Provider will continue Diagnostic Assessment.  Patient was given the following to do until next session:  May 31, 2022.    2. Provider recommended the following referrals: None at this time.      3.  Suicide Risk and Safety Concerns were assessed for Cristy Eubanks.    Patient meets the following risk assessment and triage:   Moderate Risk is identified when the patient has one of the following:  Suicidal behavior more than three months ago ( C-SSRS Suicidal Behavior Lifetime)    The following has been recommended:  Complete/Review/Update Safety Plan    Safety Plan:  Adult Short Safety Plan:   Name: Cristy Eubanks  YOB: 1990  Date: May 24, 2022   My primary care provider: Abby Vinson  My primary care clinic: [facility]  My prescriber: [facility]  Other care team support:  [facility]   My Triggers:  \"A spiraling of events with personal and work life.\"     Additional People, Places, and Things that I can access for support: Parents, best friend, [employer], read the bible         What is important to me and makes life worth living: Definitely family and \"Gods purpose for me.\"         GREEN    Good Control  1. I feel good  2. No suicidal thoughts   3. Can work, sleep and play      Action Steps  1. Self-care: " "balanced meals, exercising, sleep practices, etc.  2. Take your medications as prescribed.  3. Continue meetings with therapist and prescriber.  4.  Do the healthy things that I enjoy.  5. \"That's what I'm working on right now (with a therapist).\"           YELLOW  Getting Worse  I have ANY of these:  1. I do not feel good  2. Difficulty Concentrating  3. Sleep is changing  4. Increase/Change in my thoughts to hurt self and/or others, but I can still manage and not act on it.   5. Not taking care of self.             Action Steps (in addition to the above):  1. Inform your therapist and psychiatric prescriber/PCP.  2. Keep taking your medications as prescribed.    3. Turn to people you can ask for help.  4. Use internal coping strategies -see below.  5. Create safe environment: store firearms for safety           RED  Get Help  If I have ANY of these:  1. Current and uncontrollable thoughts and/or behaviors to hurt self and/or others.   2. \"Thinking I might actually go through with this and planning details.\"  3. Being really tearful, hugging my parents when I left.   Actions to manage my safety  1. Contact your emergency person \"My mother.\"  2. Call my crisis team- [facility] [phone] [facility]  3. Or Call 911 or go to the emergency room right away  4.  \"My friend Shefali.\"        My Internal Coping Strategies include the following:  \"Working on trying to find hobbies.  In the meantime, read the bible, stuff like that.\"    [End for Brief Safety Plan]     Safety Concerns  How To Identify Situations That Make Your Mental Health Worse:  Triggers are things that make your mental health worse.  Look at the list below to help you find your triggers and what you can do about them.     1. Identify Early Warning Signs:    Sometimes symptoms return, even when people do their best to stay well. Symptoms can develop over a short period of time with little or no warning, but most of the " time they emerge gradually over several weeks.  Early warning signs are changes that people experience when a relapse is starting. Some early warning signs are common and others are not as common.   Common Early Warning Signs:    Feeling tense or nervous, Eating less or eating more, Trouble sleeping -either too much or too little sleep, Feeling depressed or low, Feeling like not being around other people and Trouble concentrating     2. Identify action steps to take when warning signs are noticed:    Taking Action- It is important to take action if you are experiencing early warning signs of a relapse.  The faster you act, the more likely it is that you can avoid a full relapse.  It is helpful to identify several specific ways to cope with symptoms.      The following is my list of symptoms and coping strategies that I can use when they are present:    Symptom Coping Strategies   Anxiety -Talk with someone in your support system and let him or her know how you are feeling.  -Use relaxation techniques such as deep breathing or imagery.  -Use positive affirmations to counteract negative self-talk such as  I am learning to let go of worry.    Depression - Schedule your day; include activities you have to do and activities you enjoy doing.  - Get some exercise - walk, run, bike, or swim.  - Give yourself credit for even the smallest things you get done.   Sleep Difficulties   - Go to sleep at the same time every day.  - Do something relaxing before bed, such as drinking herbal tea or listening to music.  - Avoid having discussions about upsetting topics before going to bed.   Delusions   - Distract yourself from the disturbing thought by doing something that requires your attention such as a puzzle.  - Check out your beliefs by talking to someone you trust.    Hallucinations   - Use headphones to listen to music.  - Tell voices to  stop  or say to yourself,  I am safe.   - Ignore the hallucinations as much as possible;  focus on other things.   Concentration Difficulties - Minimize distractions so there is only one thing for you to focus on at a time.    - Ask the person you are having a conversation with to slow down or repeat things you are unsure of.                 Aiden Davila  May 24, 2022

## 2022-05-31 ENCOUNTER — VIRTUAL VISIT (OUTPATIENT)
Dept: PSYCHOLOGY | Facility: CLINIC | Age: 32
End: 2022-05-31
Payer: COMMERCIAL

## 2022-05-31 DIAGNOSIS — F40.01 PANIC DISORDER WITH AGORAPHOBIA: ICD-10-CM

## 2022-05-31 DIAGNOSIS — F41.1 GENERALIZED ANXIETY DISORDER: ICD-10-CM

## 2022-05-31 DIAGNOSIS — F40.00 AGORAPHOBIA: ICD-10-CM

## 2022-05-31 DIAGNOSIS — F33.1 MAJOR DEPRESSIVE DISORDER, RECURRENT EPISODE, MODERATE (H): Primary | ICD-10-CM

## 2022-05-31 PROCEDURE — 90791 PSYCH DIAGNOSTIC EVALUATION: CPT | Mod: 95 | Performed by: PSYCHOLOGIST

## 2022-05-31 ASSESSMENT — PATIENT HEALTH QUESTIONNAIRE - PHQ9
SUM OF ALL RESPONSES TO PHQ QUESTIONS 1-9: 11
SUM OF ALL RESPONSES TO PHQ QUESTIONS 1-9: 11
10. IF YOU CHECKED OFF ANY PROBLEMS, HOW DIFFICULT HAVE THESE PROBLEMS MADE IT FOR YOU TO DO YOUR WORK, TAKE CARE OF THINGS AT HOME, OR GET ALONG WITH OTHER PEOPLE: SOMEWHAT DIFFICULT

## 2022-05-31 NOTE — PROGRESS NOTES
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   300.01 (F41.0) Panic Disorder  300.22 (F40.00) Agoraphobia.  5. Provisional Diagnosis:  Attention-Deficit/Hyperactivity Disorder  314.00 (F90.0) Predominantly inattentive presentation as evidenced by difficulty with memory, concentration, organization, completion of tasks.  6. Prognosis: Expect Improvement.  7. Likely consequences of symptoms if not treated: If left untreated, the client is likely to struggle with maintaining gainful employment and a healthy social network.  8. Client strengths include:  caring, educated, employed, good listener, motivated, open to learning, open to suggestions / feedback and wants to learn.    Recommendations:     1. Plan for Safety and Risk Management:   Recommended that patient call 911 or go to the local ED should there be a change in any of these risk factors..          Report to child / adult protection services was NA.     2. Patient's identified no cultural influences to be addressed at this time.     3. Initial Treatment will focus on:    ADHD Testing:  Patient was given self and collaborative rating scales to be completed prior to the next appointment.  Depression and anxiety rating scales were completed.  Copies of (none) were requested. .     4. Resources/Service Plan:    services are not indicated.   Modifications to assist communication are not indicated.   Additional disability  accommodations are not indicated.      5. Collaboration:   Collaboration / coordination of treatment will be initiated with the following  support professionals: None at this time.      6.  Referrals:   The following referral(s) will be initiated: None at this time. Next Scheduled Appointment: July 6, 2022.     A Release of Information has been obtained for the following: None at this time.    7. NANCY:    NANCY:  Discussed the general effects of drugs and alcohol on health and well-being. Provider gave patient printed information about the effects of chemical use on their health and well being. Recommendations:  Continue to drink in moderation when drinking.     8. Records:   These were reviewed at time of assessment.   Information in this assessment was obtained from the medical record and  provided by patient who is a good historian.      Patient will have open access to their mental health medical record.         Provider Name/ Credentials:  Aiden Davila PsyD, LP  May 31, 2022

## 2022-07-06 ENCOUNTER — VIRTUAL VISIT (OUTPATIENT)
Dept: PSYCHOLOGY | Facility: CLINIC | Age: 32
End: 2022-07-06
Payer: COMMERCIAL

## 2022-07-06 DIAGNOSIS — F40.00 AGORAPHOBIA: ICD-10-CM

## 2022-07-06 DIAGNOSIS — F40.01 PANIC DISORDER WITH AGORAPHOBIA: ICD-10-CM

## 2022-07-06 DIAGNOSIS — F41.1 GENERALIZED ANXIETY DISORDER: ICD-10-CM

## 2022-07-06 DIAGNOSIS — F33.1 MAJOR DEPRESSIVE DISORDER, RECURRENT EPISODE, MODERATE (H): Primary | ICD-10-CM

## 2022-07-06 PROCEDURE — 99207 PR NO BILLABLE SERVICE THIS VISIT: CPT | Performed by: PSYCHOLOGIST

## 2022-07-06 NOTE — PROGRESS NOTES
[facility]    Provider Name:  Aiden Davila PsyD     Credentials:  LP      Patient Name: Cristy Eubanks  Date: 7/6/22    There will be no billing for this session.  This session will be added on to the final note once the Episode of Care is completed.         Service Type: Individual      Session Start Time: 5:07pm Session End Time: 6:04pm     Session Length: 57 minutes    Session #: 3    Attendees: Client attended alone    Service Modality:  Video Visit:      Provider verified identity through the following two step process.  Patient provided:  Patient photo    Telemedicine Visit: The patient's condition can be safely assessed and treated via synchronous audio and visual telemedicine encounter.      Reason for Telemedicine Visit: Services only offered telehealth    Originating Site (Patient Location): Patient's home    Distant Site (Provider Location): Provider Remote Setting- Home Office    Consent:  The patient/guardian has verbally consented to: the potential risks and benefits of telemedicine (video visit) versus in person care; bill my insurance or make self-payment for services provided; and responsibility for payment of non-covered services.     Patient would like the video invitation sent by:  Send to e-mail at: [email or web address]    Mode of Communication:  Video Conference via [facility]    As the provider I attest to compliance with applicable laws and regulations related to telemedicine.    DATA  Interactive Complexity: No  Crisis: No      Information about appointment:  Client attended three sessions to aid in determining client's mental health diagnosis or diagnoses and treatment recommendations that best address client concerns. Client records includingmedical were reviewed. A diagnostic assessment was conducted at the initial appointment. Client completed several rating scales to assist in assessing attention-related and other mental health symptoms that may be causing  "impairments in functioning. Rating scales were also completed by a collateral contact.    Assessment tools:      [place] Adult ADHD Rating Scale-IV: Self and Other Reports (BAARS-IV), [place] Functional Impairment Scale: Self and Other Reports (BFIS), [place] Deficits in Executive Functioning Scale: Self and Other Reports (BDEFS), Patient Health Questionnaire-9 (PHQ-9), Generalized Anxiety Disorder-7 (GURPREET-7) and [place] Multiphasic Personality Inventory (MMPI)    Assessment Results:    [place] Adult ADHD Rating Scale-IV: Self and Other Reports (BAARS-IV)  The BAARS-IV assesses for symptoms of ADHD that are experienced in one's daily life. This assessment measure includes self and collateral rating scales designed to provide information regarding current and childhood symptoms of ADHD including inattention, hyperactivity, and impulsivity. Self-report scores are reported as percentiles. Scores at the 76th-83rd percentile are considered marginal, scores at the 84th-92nd percentile are considered borderline, scores at the 93rd-95th percentile are considered mild, scores at the 96th-98th percentile are considered moderate, and those at the 99th percentile are considered severe. Collateral or \"other\" rating scales are reported as number of symptoms observed in comparison to those reported by the client. Norms and percentile scores are not available for collateral reports.     Current Symptoms Scale--Self Report:   Client completed the self-report inventory of current symptoms. The results indicate that the client's Total ADHD Score was 57 which places her in the 98th percentile for overall ADHD symptoms. In addition, the client endorsed 7/9 (98th percentile) Inattention symptoms, 5/5 (97th percentile) Hyperactivity symptoms, 2/4 (88th percentile) Impulsivity symptoms, and 6/9 (96th percentile) Sluggish Cognitive Tempo symptoms. Client indicated that the reported symptoms have resulted in impaired functioning in school, " work and social relationships. Overall, the results suggest the client is expeiencing Moderate ADHD symptoms.    Current Symptoms Scale--Other Report:  Client's mother and friend completed the collateral report inventory of current symptoms. Based on the collateral contact's observation of symptoms, the client demonstrates (mother - friend) 3-3/9 Inattention symptoms, 1-0/5 Hyperactivity symptoms, 0-0/4 Impulsivity symptoms, and 0-9/9 Sluggish Cognitive Tempo symptoms. The client's Total ADHD Score was 32-35. The collateral contact indicated the client demonstrates impaired functioning in home and social relationships (by friend only). The collateral- and self-report scores are significantly different.    Childhood Symptoms Scale--Self-Report:  Client completed the self-report inventory of childhood symptoms. The results indicate that the client's Total ADHD Score was 35 which places her in the 77th percentile for overall ADHD symptoms in childhood. In addition, the client endorsed 2/9 (83rd percentile) Inattention symptoms and 1/9 (78th percentile) Hyperactivity-Impulsivity symptoms. Client indicated that the reported symptoms resulted in impaired functioning in No areas. Overall, the results suggest the client experienced  Marginal symptoms of ADHD as a child.     Childhood Symptoms Scale--Other Report:  Client's mother completed the collateral report inventory of childhood symptoms. Based on the collateral contact's recollection of client's childhood symptoms, the client demonstrated 0/9 Inattention symptoms and 0/9 Hyperactivity-Impulsivity symptoms. The client's Total ADHD Score was 18. The collateral contact indicated the client demonstrates impaired functioning in (no areas clinically significant).  The collateral- and self-report scores are significantly different.                          [place]il[place] Functional Impairment Scale: Self and Other Reports (BFIS)  The BFIS is used to assess an individuals'  "psychosocial impairment in major life/daily activities that may be due to a mental health disorder. This assessment measure includes self and collateral rating scales. Self-report scores are reported as percentiles. Scores at the 76th-83rd percentile are considered marginal, scores at the 84th-92nd percentile are considered borderline, scores at the 93rd-95th percentile are considered mild, scores at the 96th-98th percentile are considered moderate, and those at the 99th percentile are considered severe.Collateral or \"other\" rating scales are reported as number of symptoms observed in comparison to those reported by the client. Norms and percentile scores are not available for collateral reports.     Results indicate the client identified impairment (scores at or greater than 93rd percentile) in the following areas: home-chores, community activities, money management, self-care routines and health maintenance The client's Mean Impairment Score was 4.9 (87th percentile) indicating the client is reporting Borderline impairment in functioning across domains. Client's mother and friend completed the collateral rating scale, which indicated discrepant results compared to the mother's score and similar when compared to her friend's score. The collateral contact's scores were generally lower (mother)-similar (friend) than the client's report.     [place] Deficits in Executive Functioning Scale (BDEFS)  The BDEFS is a measure used for evaluating dimensions of adult executive functioning in daily life.This assessment measure includes self and collateral rating scales. Self-report scores are reported as percentiles. Scores at the 76th-83rd percentile are considered marginal, scores at the 84th-92nd percentile are considered borderline, scores at the 93rd-95th percentile are considered mild, scores at the 96th-98th percentile are considered moderate, and those at the 99th percentile are considered severe.Collateral or \"other\" " "rating scales are reported as number of symptoms observed in comparison to those reported by the client. Norms and percentile scores are not available for collateral reports.     Results indicate the client's Total Executive Functioning Score was 231 (97th percentile). The ADHD-Executive Functioning Index score was 28 (95th percentile). These scores suggest the client has Moderate deficits in executive functioning. These deficits may or may not be due to ADHD; more testing is recommended.  Please see PLANS section below. Results indicate the client identified significant deficits in the following areas: self-management to time 98th percentile , self-organization/problem-solving 99th+ percentile and  self-restraint 94th percentile. Client's mother and friend completed the collateral rating scale, which indicated discrepant results compared to the client's mother's scores and similar when compared to the friend's. The collateral contact's scores were generally lower - similar (friend) than the client's report.      [place] Multiphasic Personality Inventory-2 (MMPI-2):  Others with similar Validity Scale Profiles answered in a 'yea-saying' pattern (strong tendency to answer 'True' to both questions that have opposite meaning), which may have had the effect of raising the clinical scale profile.  These people are not in any immediate distress and their problems are long-standing, \"contained,\" ego-syntonic, and non-psychotic in nature.  The profile suggests the person answered with candidness and willingness to admit to common human failings.  This profile can also be part of an attempt to present oneself as disturbed whose coping abilities are compromised.  Their defenses may be immature (a conscious suppression of emotions and an overreliance on the defenses of denial and repression), and their emotional conflicts are destabilizing.  Emotional-behavioral controls are likely to be poor.  Although the profile may be " "somewhat exaggerated toward psychopathology, it is deemed to interpretable.     Others with similar clinical scale profiles are typified by an anxious depression.  It is a common code pattern that reflects a hyperresponsible, prone-to-worry, constantly on edge, and apprehensive individual whose typical symptoms and complaints involve tension, depression, nervousness, anxiety, guilt, self-devaluation, and excessive painful introspection.  Nonproductive ruminations are characteristic and usually accompanied with feelings of inadequacy, lack of self-confidence, reduced work efficiency, and insomnia.  These person focus on their personal deficiencies, although their lives often show evidence of numerous personal achievements.  They anticipate problems before they occur but tend to overreact to minor stresses with anxious preoccupations and somatic concerns.  They tend to panic easily, catastrophizing minor setbacks and potentially disastrous.  They can have rigid attitudes regarding issues of right vs wrong.  Often such individuals are overcontrolled and have difficulty expressing their feelings openly.  In relationships, they often show a pattern of dependency and lack of assertiveness.  They have a difficult time saying \"no\" to more responsibilities but as they accumulate they feel overwhelmed and are then prone to becoming depressed and anxious. Perfectionistic tendencies may be present and these individuals tend to be self-blaming and self-punishing when things go wrong.    Others with similar subscale scores report a variety of physical symptoms along with psychological and mental difficulties in the areas of comprehension, concentration, memory, judgment, energy, and work efficiency.  They report a lack of energy resulting in difficulty carrying out routine activities, reduced levels of interest and sociability, avoidance of confrontation, nervousness, and fears of losing one's mind.  They cry easily, brood, " ruminate, and are easily hurt by criticism.  Subscale scores reflect someone who feels isolated, and unhappy; regret past behaviors, and have been disappointed in love.  They report being strongly affected by feelings, cry easily, feel more intensely than others do, and are more sensitive than others.  They are likely experiencing family conflict.  Some scores suggest depression, suicidal ideation, despair, and apathy with an inability to cope.    Generalized Anxiety Disorder Questionnaire (GURPREET-7)  This questionnaire is designed to assess for anxiety in adults.  Based on the score, she is experiencing severe symptoms of anxiety. Client identified the following symptoms of anxiety: feeling on edge/nervous/anxious, difficulty controlling worry, worrying about many different things, trouble relaxing, being restless, becoming easily annoyed or irritable and feeling something awful might happen    Patient Health Questionnaire- 9 (PHQ-9)   This questionnaire is designed to assess for depression in adults.  Based on the score, she is experiencing moderate symptoms of depression. Client identified the following symptoms of depression: depressed mood, lack of interest, difficulty with sleep, poor appetite or overeating, feeling bad about self and poor concentration.         Summary (based on clinical interview, review of records, test results):  Results for the data presented above were mixed and inconclusive for ADHD; therefore, the client was given the option to continue with further tests or not.  She choose to take additional testing in hopes of clarifying her diagnosis(es).  See Recommendations below.    Diagnosis:   Major Depressive Disorder, Recurrent, With melancholic features  Generalized Anxiety Disorder  Panic Disorder  Agoraphobia    Recommendations:    The client agreed to taking the CNS Vital Signs testing (Neurocognitive testing) to possibly help clarify her diagnosis(es).  This writer will contact client at a  future date to arrange the administration of the CSN Vital Signs.       Online Self-help Websites for ADHD Symptoms:  [email or web address]  [email or web address]  [email or web address]  [email or web address]  [email or web address]      Aiden Davila

## 2022-09-11 ENCOUNTER — HEALTH MAINTENANCE LETTER (OUTPATIENT)
Age: 32
End: 2022-09-11

## 2023-01-20 ENCOUNTER — OFFICE VISIT (OUTPATIENT)
Dept: MIDWIFE SERVICES | Facility: CLINIC | Age: 33
End: 2023-01-20
Payer: COMMERCIAL

## 2023-01-20 VITALS
WEIGHT: 173.6 LBS | BODY MASS INDEX: 27.9 KG/M2 | DIASTOLIC BLOOD PRESSURE: 86 MMHG | SYSTOLIC BLOOD PRESSURE: 118 MMHG | HEIGHT: 66 IN

## 2023-01-20 DIAGNOSIS — Z30.432 ENCOUNTER FOR IUD REMOVAL: Primary | ICD-10-CM

## 2023-01-20 DIAGNOSIS — Z11.51 SCREENING FOR HUMAN PAPILLOMAVIRUS: ICD-10-CM

## 2023-01-20 PROBLEM — Z97.5 PRESENCE OF INTRAUTERINE CONTRACEPTIVE DEVICE: Status: RESOLVED | Noted: 2017-10-24 | Resolved: 2023-01-20

## 2023-01-20 PROCEDURE — 58301 REMOVE INTRAUTERINE DEVICE: CPT

## 2023-01-20 PROCEDURE — 87624 HPV HI-RISK TYP POOLED RSLT: CPT

## 2023-01-20 PROCEDURE — G0145 SCR C/V CYTO,THINLAYER,RESCR: HCPCS

## 2023-01-20 RX ORDER — NYSTATIN 100000 U/G
OINTMENT TOPICAL 2 TIMES DAILY
Qty: 15 G | Refills: 1 | Status: SHIPPED | OUTPATIENT
Start: 2023-01-20

## 2023-01-20 NOTE — PROGRESS NOTES
IUD Removal:  SUBJECTIVE:    Is a pregnancy test required: No.  Was a consent obtained?  Yes    Cristy Eubanks is a 32 year old female,, No LMP recorded. (Menstrual status: IUD). who presents today for IUD removal. Her current IUD was placed 4 years ago. She has not had problems with the IUD. She requests removal of the IUD because she is not sexually active and wants to off hormones.    Today's PHQ-2 Score:   PHQ-2 (  [employer]) 2023   Q1: Little interest or pleasure in doing things 0   Q2: Feeling down, depressed or hopeless 0   PHQ-2 Score 0   PHQ-2 Total Score (12-17 Years)- Positive if 3 or more points; Administer PHQ-A if positive -       (Z11.51) Screening for human papillomavirus  Plan: Pap screen with HPV - recommended age 30 - 65         years            (Z30.432) Encounter for IUD removal      PROCEDURE:    A speculum exam was performed and the cervix was visualized. The IUD string was visualized. Using ring forceps, the string  was grasped and the IUD removed intact.    POST PROCEDURE:    The patient tolerated the procedure well. Patient was discharged in stable condition.    Call if bleeding, pain or fever occur. and Birth control counseling given.     JORGE LANDRUM CNM

## 2023-01-20 NOTE — NURSING NOTE
"Chief Complaint   Patient presents with     Women's Health     Last pap was 10/24/2017: NIL     Procedure     Mirena IUD inserted on 2018. Requesting IUD removal. Would like to just not be on hormones right now and states she has a history of uterine cancer in her family.     Vaginal Problem     Reports itching and irritation on exterior of vaginal area for 1-2 weeks.       Initial /86 (BP Location: Right arm, Cuff Size: Adult Regular)   Ht 1.676 m (5' 6\")   Wt 78.7 kg (173 lb 9.6 oz)   BMI 28.02 kg/m   Estimated body mass index is 28.02 kg/m  as calculated from the following:    Height as of this encounter: 1.676 m (5' 6\").    Weight as of this encounter: 78.7 kg (173 lb 9.6 oz).  BP completed using cuff size: regular    Questioned patient about current smoking habits.  Pt. has never smoked.          The following HM Due: pap smear  "

## 2023-01-25 LAB
BKR LAB AP GYN ADEQUACY: NORMAL
BKR LAB AP GYN INTERPRETATION: NORMAL
BKR LAB AP HPV REFLEX: NORMAL
BKR LAB AP PREVIOUS ABNORMAL: NORMAL
PATH REPORT.COMMENTS IMP SPEC: NORMAL
PATH REPORT.COMMENTS IMP SPEC: NORMAL
PATH REPORT.RELEVANT HX SPEC: NORMAL

## 2023-01-26 ENCOUNTER — VIRTUAL VISIT (OUTPATIENT)
Dept: PSYCHOLOGY | Facility: CLINIC | Age: 33
End: 2023-01-26

## 2023-01-26 DIAGNOSIS — F41.1 GENERALIZED ANXIETY DISORDER: ICD-10-CM

## 2023-01-26 DIAGNOSIS — F33.0 MAJOR DEPRESSIVE DISORDER, RECURRENT EPISODE, MILD (H): Primary | ICD-10-CM

## 2023-01-26 DIAGNOSIS — F40.01 PANIC DISORDER WITH AGORAPHOBIA: ICD-10-CM

## 2023-01-26 DIAGNOSIS — F40.00 AGORAPHOBIA: ICD-10-CM

## 2023-01-26 PROCEDURE — 96131 PSYCL TST EVAL PHYS/QHP EA: CPT | Mod: 95 | Performed by: PSYCHOLOGIST

## 2023-01-26 PROCEDURE — 96130 PSYCL TST EVAL PHYS/QHP 1ST: CPT | Mod: 95 | Performed by: PSYCHOLOGIST

## 2023-01-26 ASSESSMENT — ANXIETY QUESTIONNAIRES
1. FEELING NERVOUS, ANXIOUS, OR ON EDGE: NEARLY EVERY DAY
GAD7 TOTAL SCORE: 16
7. FEELING AFRAID AS IF SOMETHING AWFUL MIGHT HAPPEN: SEVERAL DAYS
IF YOU CHECKED OFF ANY PROBLEMS ON THIS QUESTIONNAIRE, HOW DIFFICULT HAVE THESE PROBLEMS MADE IT FOR YOU TO DO YOUR WORK, TAKE CARE OF THINGS AT HOME, OR GET ALONG WITH OTHER PEOPLE: NOT DIFFICULT AT ALL
GAD7 TOTAL SCORE: 16
5. BEING SO RESTLESS THAT IT IS HARD TO SIT STILL: NEARLY EVERY DAY
2. NOT BEING ABLE TO STOP OR CONTROL WORRYING: SEVERAL DAYS
4. TROUBLE RELAXING: NEARLY EVERY DAY
6. BECOMING EASILY ANNOYED OR IRRITABLE: NEARLY EVERY DAY
7. FEELING AFRAID AS IF SOMETHING AWFUL MIGHT HAPPEN: SEVERAL DAYS
3. WORRYING TOO MUCH ABOUT DIFFERENT THINGS: MORE THAN HALF THE DAYS
GAD7 TOTAL SCORE: 16
8. IF YOU CHECKED OFF ANY PROBLEMS, HOW DIFFICULT HAVE THESE MADE IT FOR YOU TO DO YOUR WORK, TAKE CARE OF THINGS AT HOME, OR GET ALONG WITH OTHER PEOPLE?: NOT DIFFICULT AT ALL

## 2023-01-26 ASSESSMENT — PATIENT HEALTH QUESTIONNAIRE - PHQ9
10. IF YOU CHECKED OFF ANY PROBLEMS, HOW DIFFICULT HAVE THESE PROBLEMS MADE IT FOR YOU TO DO YOUR WORK, TAKE CARE OF THINGS AT HOME, OR GET ALONG WITH OTHER PEOPLE: NOT DIFFICULT AT ALL
SUM OF ALL RESPONSES TO PHQ QUESTIONS 1-9: 4
SUM OF ALL RESPONSES TO PHQ QUESTIONS 1-9: 4

## 2023-01-26 NOTE — PROGRESS NOTES
[facility]   Mental Health & Addiction Services     Provider Name:  Aiden Davila PsyD     Credentials:  LP      ADHD Assessment Results, Summary, and Recommendations       Patient Name:  Cristy Eubanks Date: 1/26/23  YOB: 1990  MRN: [id number]  Date(s) of assessment:     Psychological Testing  Billing/Services Summary     Integration/Report Generation  (Start/Stop), (Date, Day #)  6:09pm/6:27pm                7/5/22 / Day 1             18 minutes  7:23pm/8:28pm                7/5/22 / Day 1             65 minutes  4:00pm/4:20pm                1/22/23 / Day 3           20 minutes  10:01am/11:01am            1/30/23 / Day 5           60 minutes       Interactive Feedback Session   (Start/Stop), (Date, Day #)  5:07pm/6:04pm                 7/6/22 / Day 2            57 minutes  11:02am/12:03pm             1/26/23 / Day 4          61 minutes        Total Time:     281 Minutes (4 hours, 41 minutes)     Total Units:                1 ([id number])         4 ([id number])         Service Type: Individual     Visit #: 3    Attendees: Client attended alone    Service Modality:  Video Visit:      Provider verified identity through the following two step process.  Patient provided:  Patient photo    Telemedicine Visit: The patient's condition can be safely assessed and treated via synchronous audio and visual telemedicine encounter.      Reason for Telemedicine Visit: Services only offered telehealth    Originating Site (Patient Location): Patient's home    Distant Site (Provider Location): Provider Remote Setting- Home Office    Consent:  The patient/guardian has verbally consented to: the potential risks and benefits of telemedicine (video visit) versus in person care; bill my insurance or make self-payment for services provided; and responsibility for payment of non-covered services.     Patient would like the video invitation sent by:  Send to e-mail at: [email or web address]    Mode of  Communication:  Video Conference via [email or web address]    Distant Location (Provider):  Off-site    As the provider I attest to compliance with applicable laws and regulations related to telemedicine.       DATA:   Interactive Complexity: No   Crisis: No       ASSESSMENT:  Mental Status Assessment:  Appearance:   Appropriate   Eye Contact:   Good   Psychomotor Behavior: Normal   Attitude:   Cooperative  Pleasant  Orientation:   All  Speech   Rate / Production: Normal/ Responsive   Volume:  Normal   Mood:    Anxious  Normal  Affect:    Appropriate   Thought Content:  Clear   Thought Form:  Coherent   Insight:    Good  and Fair       Safety Issues and Plan for Safety and Risk Management:   [place] Suicide Severity Rating Scale (Short Version)No flowsheet data found.  Patient denies current fears or concerns for personal safety.  Patient denies current or recent suicidal ideation or behaviors.  Patient denies current or recent homicidal ideation or behaviors.  Patient denies current or recent self injurious behavior or ideation.  Patient denies other safety concerns.  Recommended that patient call 911 or go to the local ED should there be a change in any of these risk factors.  Patient reports there are firearms in the house. The firearms are secured in a locked space.      Information about appointment:  Client attended three sessions to aid in determining client's mental health diagnosis or diagnoses and treatment recommendations that best address client concerns. Client records includingmedical were reviewed. A diagnostic assessment was conducted at the initial appointment. Client completed several rating scales to assist in assessing attention-related and other mental health symptoms that may be causing impairments in functioning. Rating scales were also completed by a collateral contact.    Assessment tools:      [place] Adult ADHD Rating Scale-IV: Self and Other Reports (BAARS-IV), [place] Functional Impairment Scale: Self  "and Other Reports (BFIS), [place] Deficits in Executive Functioning Scale: Self and Other Reports (BDEFS), Patient Health Questionnaire-9 (PHQ-9), Generalized Anxiety Disorder-7 (GURPREET-7), [place] Multiphasic Personality Inventory (MMPI) and CNS Vital Signs (CNS VS)    Assessment Results:    [place] Adult ADHD Rating Scale-IV: Self and Other Reports (BAARS-IV)  The BAARS-IV assesses for symptoms of ADHD that are experienced in one's daily life. This assessment measure includes self and collateral rating scales designed to provide information regarding current and childhood symptoms of ADHD including inattention, hyperactivity, and impulsivity. Self-report scores are reported as percentiles. Scores at the 76th-83rd percentile are considered marginal, scores at the 84th-92nd percentile are considered borderline, scores at the 93rd-95th percentile are considered mild, scores at the 96th-98th percentile are considered moderate, and those at the 99th percentile are considered severe. Collateral or \"other\" rating scales are reported as number of symptoms observed in comparison to those reported by the client. Norms and percentile scores are not available for collateral reports.     Current Symptoms Scale--Self Report:   Client completed the self-report inventory of current symptoms. The results indicate that the client's Total ADHD Score was 57 which places her in the 98th percentile for overall ADHD symptoms. In addition, the client endorsed 7/9 (98th percentile) Inattention symptoms, 5/5 (97th percentile) Hyperactivity symptoms, 2/4 (88th percentile) Impulsivity symptoms, and 6/9 (96th percentile) Sluggish Cognitive Tempo symptoms. Client indicated that the reported symptoms have resulted in impaired functioning in school, work and social relationships. Overall, the results suggest the client is expeiencing Moderate ADHD symptoms.    Current Symptoms Scale--Other Report:  Client's mother and friend completed the " collateral report inventory of current symptoms. Based on the collateral contact's observation of symptoms, the client demonstrates (mother - friend) 3-3/9 Inattention symptoms, 1-0/5 Hyperactivity symptoms, 0-0/4 Impulsivity symptoms, and 0-9/9 Sluggish Cognitive Tempo symptoms. The client's Total ADHD Score was 32-35. The collateral contact indicated the client demonstrates impaired functioning in home and social relationships (by friend only). The collateral- and self-report scores are significantly different.    Childhood Symptoms Scale--Self-Report:  Client completed the self-report inventory of childhood symptoms. The results indicate that the client's Total ADHD Score was 35 which places her in the 77th percentile for overall ADHD symptoms in childhood. In addition, the client endorsed 2/9 (83rd percentile) Inattention symptoms and 1/9 (78th percentile) Hyperactivity-Impulsivity symptoms. Client indicated that the reported symptoms resulted in impaired functioning in No areas. Overall, the results suggest the client experienced  Marginal symptoms of ADHD as a child.     Childhood Symptoms Scale--Other Report:  Client's mother completed the collateral report inventory of childhood symptoms. Based on the collateral contact's recollection of client's childhood symptoms, the client demonstrated 0/9 Inattention symptoms and 0/9 Hyperactivity-Impulsivity symptoms. The client's Total ADHD Score was 18. The collateral contact indicated the client demonstrates impaired functioning in (no areas clinically significant).  The collateral- and self-report scores are significantly different.                          [place] Functional Impairment Scale: Self and Other Reports (BFIS)  The BFIS is used to assess an individuals' psychosocial impairment in major life/daily activities that may be due to a mental health disorder. This assessment measure includes self and collateral rating scales. Self-report scores are reported as  "percentiles. Scores at the 76th-83rd percentile are considered marginal, scores at the 84th-92nd percentile are considered borderline, scores at the 93rd-95th percentile are considered mild, scores at the 96th-98th percentile are considered moderate, and those at the 99th percentile are considered severe.Collateral or \"other\" rating scales are reported as number of symptoms observed in comparison to those reported by the client. Norms and percentile scores are not available for collateral reports.     Results indicate the client identified impairment (scores at or greater than 93rd percentile) in the following areas: home-chores, community activities, money management, self-care routines and health maintenance The client's Mean Impairment Score was 4.9 (87th percentile) indicating the client is reporting Borderline impairment in functioning across domains. Client's mother and friend completed the collateral rating scale, which indicated discrepant results compared to the mother's score and similar when compared to her friend's score. The collateral contact's scores were generally lower (mother)-similar (friend) than the client's report.     [place] Deficits in Executive Functioning Scale (BDEFS)  The BDEFS is a measure used for evaluating dimensions of adult executive functioning in daily life.This assessment measure includes self and collateral rating scales. Self-report scores are reported as percentiles. Scores at the 76th-83rd percentile are considered marginal, scores at the 84th-92nd percentile are considered borderline, scores at the 93rd-95th percentile are considered mild, scores at the 96th-98th percentile are considered moderate, and those at the 99th percentile are considered severe.Collateral or \"other\" rating scales are reported as number of symptoms observed in comparison to those reported by the client. Norms and percentile scores are not available for collateral reports.     Results indicate the " "client's Total Executive Functioning Score was 231 (97th percentile). The ADHD-Executive Functioning Index score was 28 (95th percentile). These scores suggest the client has Moderate deficits in executive functioning. These deficits may or may not be due to ADHD; more testing is recommended.  Please see PLANS section below. Results indicate the client identified significant deficits in the following areas: self-management to time 98th percentile , self-organization/problem-solving 99th+ percentile and  self-restraint 94th percentile. Client's mother and friend completed the collateral rating scale, which indicated discrepant results compared to the client's mother's scores and similar when compared to the friend's. The collateral contact's scores were generally lower - similar (friend) than the client's report.      [place] Multiphasic Personality Inventory-2 (MMPI-2):  Others with similar Validity Scale Profiles answered in a 'yea-saying' pattern (strong tendency to answer 'True' to both questions that have opposite meaning), which may have had the effect of raising the clinical scale profile.  These people are not in any immediate distress and their problems are long-standing, \"contained,\" ego-syntonic, and non-psychotic in nature.  The profile suggests the person answered with candidness and willingness to admit to common human failings.  This profile can also be part of an attempt to present oneself as disturbed whose coping abilities are compromised.  Their defenses may be immature (a conscious suppression of emotions and an overreliance on the defenses of denial and repression), and their emotional conflicts are destabilizing.  Emotional-behavioral controls are likely to be poor.  Although the profile may be somewhat exaggerated toward psychopathology, it is deemed to interpretable.     Others with similar clinical scale profiles are typified by an anxious depression.  It is a common code pattern that " "reflects a hyperresponsible, prone-to-worry, constantly on edge, and apprehensive individual whose typical symptoms and complaints involve tension, depression, nervousness, anxiety, guilt, self-devaluation, and excessive painful introspection.  Nonproductive ruminations are characteristic and usually accompanied with feelings of inadequacy, lack of self-confidence, reduced work efficiency, and insomnia.  These person focus on their personal deficiencies, although their lives often show evidence of numerous personal achievements.  They anticipate problems before they occur but tend to overreact to minor stresses with anxious preoccupations and somatic concerns.  They tend to panic easily, catastrophizing minor setbacks and potentially disastrous.  They can have rigid attitudes regarding issues of right vs wrong.  Often such individuals are overcontrolled and have difficulty expressing their feelings openly.  In relationships, they often show a pattern of dependency and lack of assertiveness.  They have a difficult time saying \"no\" to more responsibilities but as they accumulate they feel overwhelmed and are then prone to becoming depressed and anxious. Perfectionistic tendencies may be present and these individuals tend to be self-blaming and self-punishing when things go wrong.    Others with similar subscale scores report a variety of physical symptoms along with psychological and mental difficulties in the areas of comprehension, concentration, memory, judgment, energy, and work efficiency.  They report a lack of energy resulting in difficulty carrying out routine activities, reduced levels of interest and sociability, avoidance of confrontation, nervousness, and fears of losing one's mind.  They cry easily, brood, ruminate, and are easily hurt by criticism.  Subscale scores reflect someone who feels isolated, and unhappy; regret past behaviors, and have been disappointed in love.  They report being strongly " affected by feelings, cry easily, feel more intensely than others do, and are more sensitive than others.  They are likely experiencing family conflict.  Some scores suggest depression, suicidal ideation, despair, and apathy with an inability to cope.    Generalized Anxiety Disorder Questionnaire (GURRPEET-7)  This questionnaire is designed to assess for anxiety in adults.  Based on the score, she is experiencing severe symptoms of anxiety. Client identified the following symptoms of anxiety: feeling on edge/nervous/anxious, difficulty controlling worry, worrying about many different things, trouble relaxing, being restless, becoming easily annoyed or irritable and feeling something awful might happen    Patient Health Questionnaire- 9 (PHQ-9)   This questionnaire is designed to assess for depression in adults.  Based on the score, she is experiencing moderate symptoms of depression. Client identified the following symptoms of depression: depressed mood, lack of interest, difficulty with sleep, poor appetite or overeating, feeling bad about self and poor concentration.         Summary (based on clinical interview, review of records, test results):  Based on the client's Liliana Questionnaires, GURPREET-7, PHQ-9, [place] Multiphasic Personality Inventory-2 (MMPI-2), and interview results, the client has been diagnosed with Attention-Deficit/Hyperactivity Disorder, Predominantly inattentive presentation, Mild (ADHD); Major Depressive Disorder, Recurrent, In partial remission (MDD); Postraumatic Stress Disorder (PTSD); and Unspecified Anxiety Disorder.     None of the client's Self-report scores for childhood symptoms (BAARS-IV: Childhood Symptoms) were clinically significant (at or above the 93rd percentile rank) for symptoms during childhood.  All three scores, Overall and two subscales: Inattention and Hyperactivity/Impulsivity, were in the Marginal range for ADHD symptoms.  Likewise for the client's collateral  in  that none of the three childhood scores were clinically significant for ADHD symptoms.  All three scores were Within-Normal-Limits (within the expected range).     The client's Self-report scores for current functioning (BAARS-IV: Current Symptoms) were clinically significant for ADHD symptoms for their Overall score (Moderate range), as well as, for three of the four subscales:  Inattention, Hyperactivity, and Sluggish Cognitive Tempo, all in the Moderate range for ADHD symptoms.  The client had two Collateral reporters, her mom and a friend.  Of the five scores within this questionnaire, only one score from the two reporters fell into the clinically significant range for ADHD symptoms (Severe range): Sluggish Cognitive Tempo.  Of note, both reporters scores for the Inattention subscale was at the 92nd percentile (one percent below being clinically significant).     Results for the Executive Functioning scales (BDEFS-LF) scores were fairly similar between the client and her friend's reporting.  The scores between the client's self-report and her mother's were discrepant with the mom's scores coming in much lower.  The client's self-report scores fell into the clinically significant range for ADHD for the Overall score (Moderate range) and three of the five subscales: Self-management to time (Moderate), Self-organization/Problem solving (Severe), and Self-restraint (Mild).  This was similar to the friend's report scores in which the same four scores also fell into the clinically significant range for ADHD (Overall - Moderate range and subscales: Self-management to time (Moderate), Self-organization/Problem solving (Mild), and Self-restraint (Moderate) , as well as, a fourth subscale: Self-motivation (Moderate).  None of the mother's scores, Overall or subscales, fell into the clinically significant range for ADHD (Marginal range was the highest score).     In addition, the ADHD-EF Index Score, a strong indicator of  "ADHD when at or above the 95th percentile, were scored at the 95th percentile for the Self-report, and 96th for the friend's Collateral report and 81st percentile for the mom's Collateral-report.  Neither the self- or either collateral-report indicated that the client's symptoms interfere significantly in multiple aspects of the client's daily life (BFIS-LF). The client's PHQ-9 score of 13 indicates the possible presence of Moderate depression and their GURPREET-7 score of 16 indicates the possible presence of Severe anxiety.    The client and their collateral 's data for possible symptoms of ADHD were mixed for the Liliana Questionnaires.  Neither the client nor their Collateral  reported significant symptoms of ADHD during Childhood.  For current functioning, however, there were some mixed results.  The client indicated significant symptoms for current functioning which was also reported by one of her collateral reporters (friend).  On the other hand, her second collateral report, her mom, did not endorse any symptoms outside of the marginal range for ADHD with the exception of Inattention (just below the clinically significant rome of 93%).  During the time the Liliana ADHD questionnaires were completed, the client also completed the GURPREET-7 and PHQ-9 which endorsed possible Severe anxiety and Moderate depression, respectively.  Significant levels of anxiety and depression were also noted with the client's MMPI-2 profile, \"Others with similar clinical scale profiles are typified by an anxious depression.  It is a common code pattern that reflects a hyperresponsible, prone-to-worry, constantly on edge, and apprehensive individual whose typical symptoms and complaints involve tension, depression, nervousness, anxiety, guilt, self-devaluation, and excessive painful introspection.\"    CNS Vital Signs:  Reviewed results of CNS Vital Signs (CNS VS) testing, a neurocognitive test, revealed an average " "Neurocognition Index - a form of a global score of overall neurocognitive functioning.  Relative strengths of the client's, based on \"Above Average\" scores, were in the areas of: Verbal Memory which measure how well a person can recognize, remember, and retrieve words.;  and Sustained Attention measures how well a person can direct and focus cognitive activity on specific stimuli.     There were areas of relative weakness, based on below average scores: Psychomotor Speed measures how well a subject perceives, attends, responds, to visual-perceptual information, and performs motor speed and fine motor coordination; Reaction Time measures how well a subject can react to a simple and increasingly complex direction set.; Cognitive Flexibility which is a measure of how well a person is able to adapt to rapidly changing and increasingly complex set of directions and/or to manipulate the information.;  Processing Speed which measures how well a person recognizes and processes information; Executive Functioning which measures how well a person recognizes rules, categories, and manages or navigates rapid decision making.; and Motor Speed measures a person's ability to perform movements to produce and satisfy an intention towards a manual action and goal.    The pattern of difficulties demonstrated with the CNS VS was more for depression and sleep issues than it was for ADHD.  Although some of the domains that were a 'relative weakness' above are affected by ADHD, those same domains are also affected heavily by depression and/or sleep, with the remaining domains that rated as a relative weakness were not affected by ADHD, yet, were typically affected by depression and/or sleep.  The client also completed the [place] Sleepiness Scale (ESS) SF-8 which is a measure of restful sleep difficulties.  The average score on this scale, indicated the person is getting enough sleep, is if they score a 6 or less.  The average score is a 7 " "or 8.  The client scored an 11 on this questionnaire indicated sleep problems.  Because of the depression that was endorsed within the MMPI-2, PHQ-9, and CNS VS, ADHD was not diagnosed at this time.  It is much more likely the client's issues are caused by depression, anxiety, and possibly not getting enough restful sleep.      Diagnosis:   296.31 (F33.0)   Major Depressive Disorder, Recurrent, Mild, With melancholic features  300.02 (F41.1)   Generalized Anxiety Disorder  300.01 (F41.0)   Panic Disorder  300.22 (F40.00) Agoraphobia    Recommendations:    Schedule an appointment with your physician to discuss a medication evaluation., Consider working with an ADHD  or individual therapist to learn skills to  assist with symptom management, as well as ways to improve relationships,  etc that may have been impacted by your symptoms. , Schedule a follow-up appointment with me in about six weeks to review symptoms, treatment involvement, and struggles and/or successes. and The data shows significant anxiety and depression which are likely the cause of the client's difficulties.  There is a possibility that the client's symptoms of anxiety and depression are hiding symptoms of ADHD and the instruments are not sensitive enough to pick them up.  In the future, if the client believes her anxiety and depression are well managed, and they continue to struggle with symptoms that could be ADHD, administering the WAIS-IV may provide more uselful data in testing for ADHD.  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 She stated she has been experiencing difficulties at work due to her \"Life and death situations at work.\"  The MMPI-2 did indicated significant trauma endorsement.  The client's trauma responses have been growing stronger over the past year, after the interview portion of this assessment was done.  A discussion was had with the " client about talking to her current therapist to treat for PTSD, as well as, ADHD if the client felt it was an area of competence for them.       Online Self-help Websites for ADHD (type) Symptoms:  Add.org  [email or web address].org  [email or web address]  [email or web address]  [email or web address]      Aiden Davila

## 2023-01-27 LAB
HUMAN PAPILLOMA VIRUS 16 DNA: NEGATIVE
HUMAN PAPILLOMA VIRUS 18 DNA: NEGATIVE
HUMAN PAPILLOMA VIRUS FINAL DIAGNOSIS: NORMAL
HUMAN PAPILLOMA VIRUS OTHER HR: NEGATIVE

## 2023-04-30 ENCOUNTER — HEALTH MAINTENANCE LETTER (OUTPATIENT)
Age: 33
End: 2023-04-30

## 2024-07-07 ENCOUNTER — HEALTH MAINTENANCE LETTER (OUTPATIENT)
Age: 34
End: 2024-07-07

## 2024-11-19 ENCOUNTER — OFFICE VISIT (OUTPATIENT)
Dept: INTERNAL MEDICINE | Facility: CLINIC | Age: 34
End: 2024-11-19
Payer: COMMERCIAL

## 2024-11-19 VITALS
TEMPERATURE: 97.6 F | BODY MASS INDEX: 28.85 KG/M2 | DIASTOLIC BLOOD PRESSURE: 84 MMHG | HEIGHT: 66 IN | SYSTOLIC BLOOD PRESSURE: 132 MMHG | OXYGEN SATURATION: 100 % | HEART RATE: 86 BPM | RESPIRATION RATE: 17 BRPM | WEIGHT: 179.5 LBS

## 2024-11-19 DIAGNOSIS — N92.6 IRREGULAR MENSTRUAL CYCLE: ICD-10-CM

## 2024-11-19 DIAGNOSIS — F41.9 ANXIETY AND DEPRESSION: ICD-10-CM

## 2024-11-19 DIAGNOSIS — Z00.00 ENCOUNTER FOR PREVENTIVE HEALTH EXAMINATION: Primary | ICD-10-CM

## 2024-11-19 DIAGNOSIS — R63.5 WEIGHT GAIN: ICD-10-CM

## 2024-11-19 DIAGNOSIS — F32.A ANXIETY AND DEPRESSION: ICD-10-CM

## 2024-11-19 LAB
BASOPHILS # BLD AUTO: 0 10E3/UL (ref 0–0.2)
BASOPHILS NFR BLD AUTO: 0 %
EOSINOPHIL # BLD AUTO: 0.3 10E3/UL (ref 0–0.7)
EOSINOPHIL NFR BLD AUTO: 4 %
ERYTHROCYTE [DISTWIDTH] IN BLOOD BY AUTOMATED COUNT: 12.2 % (ref 10–15)
EST. AVERAGE GLUCOSE BLD GHB EST-MCNC: 103 MG/DL
HBA1C MFR BLD: 5.2 % (ref 0–5.6)
HCT VFR BLD AUTO: 42.6 % (ref 35–47)
HGB BLD-MCNC: 14.3 G/DL (ref 11.7–15.7)
IMM GRANULOCYTES # BLD: 0 10E3/UL
IMM GRANULOCYTES NFR BLD: 0 %
LYMPHOCYTES # BLD AUTO: 2.6 10E3/UL (ref 0.8–5.3)
LYMPHOCYTES NFR BLD AUTO: 27 %
MCH RBC QN AUTO: 29.4 PG (ref 26.5–33)
MCHC RBC AUTO-ENTMCNC: 33.6 G/DL (ref 31.5–36.5)
MCV RBC AUTO: 88 FL (ref 78–100)
MONOCYTES # BLD AUTO: 0.6 10E3/UL (ref 0–1.3)
MONOCYTES NFR BLD AUTO: 6 %
NEUTROPHILS # BLD AUTO: 6.2 10E3/UL (ref 1.6–8.3)
NEUTROPHILS NFR BLD AUTO: 64 %
PLATELET # BLD AUTO: 296 10E3/UL (ref 150–450)
RBC # BLD AUTO: 4.87 10E6/UL (ref 3.8–5.2)
WBC # BLD AUTO: 9.7 10E3/UL (ref 4–11)

## 2024-11-19 PROCEDURE — 83036 HEMOGLOBIN GLYCOSYLATED A1C: CPT | Performed by: NURSE PRACTITIONER

## 2024-11-19 PROCEDURE — 82670 ASSAY OF TOTAL ESTRADIOL: CPT | Performed by: NURSE PRACTITIONER

## 2024-11-19 PROCEDURE — 36415 COLL VENOUS BLD VENIPUNCTURE: CPT | Performed by: NURSE PRACTITIONER

## 2024-11-19 PROCEDURE — 80061 LIPID PANEL: CPT | Performed by: NURSE PRACTITIONER

## 2024-11-19 PROCEDURE — 84443 ASSAY THYROID STIM HORMONE: CPT | Performed by: NURSE PRACTITIONER

## 2024-11-19 PROCEDURE — 84144 ASSAY OF PROGESTERONE: CPT | Performed by: NURSE PRACTITIONER

## 2024-11-19 PROCEDURE — 84439 ASSAY OF FREE THYROXINE: CPT | Performed by: NURSE PRACTITIONER

## 2024-11-19 PROCEDURE — 90715 TDAP VACCINE 7 YRS/> IM: CPT | Performed by: NURSE PRACTITIONER

## 2024-11-19 PROCEDURE — 82306 VITAMIN D 25 HYDROXY: CPT | Performed by: NURSE PRACTITIONER

## 2024-11-19 PROCEDURE — 90471 IMMUNIZATION ADMIN: CPT | Performed by: NURSE PRACTITIONER

## 2024-11-19 PROCEDURE — 85025 COMPLETE CBC W/AUTO DIFF WBC: CPT | Performed by: NURSE PRACTITIONER

## 2024-11-19 PROCEDURE — 84481 FREE ASSAY (FT-3): CPT | Performed by: NURSE PRACTITIONER

## 2024-11-19 PROCEDURE — 82627 DEHYDROEPIANDROSTERONE: CPT | Performed by: NURSE PRACTITIONER

## 2024-11-19 PROCEDURE — 80053 COMPREHEN METABOLIC PANEL: CPT | Performed by: NURSE PRACTITIONER

## 2024-11-19 PROCEDURE — 99214 OFFICE O/P EST MOD 30 MIN: CPT | Mod: 25 | Performed by: NURSE PRACTITIONER

## 2024-11-19 PROCEDURE — 99385 PREV VISIT NEW AGE 18-39: CPT | Mod: 25 | Performed by: NURSE PRACTITIONER

## 2024-11-19 RX ORDER — BUPROPION HYDROCHLORIDE 150 MG/1
150 TABLET ORAL
COMMUNITY
Start: 2024-10-25

## 2024-11-19 RX ORDER — VILAZODONE HYDROCHLORIDE 40 MG/1
40 TABLET ORAL DAILY
COMMUNITY
Start: 2024-10-25

## 2024-11-19 RX ORDER — BUPROPION HYDROCHLORIDE 300 MG/1
300 TABLET ORAL
COMMUNITY
Start: 2024-10-25

## 2024-11-19 SDOH — HEALTH STABILITY: PHYSICAL HEALTH: ON AVERAGE, HOW MANY DAYS PER WEEK DO YOU ENGAGE IN MODERATE TO STRENUOUS EXERCISE (LIKE A BRISK WALK)?: 1 DAY

## 2024-11-19 ASSESSMENT — SOCIAL DETERMINANTS OF HEALTH (SDOH): HOW OFTEN DO YOU GET TOGETHER WITH FRIENDS OR RELATIVES?: TWICE A WEEK

## 2024-11-19 ASSESSMENT — PAIN SCALES - GENERAL: PAINLEVEL_OUTOF10: NO PAIN (0)

## 2024-11-19 ASSESSMENT — PATIENT HEALTH QUESTIONNAIRE - PHQ9
SUM OF ALL RESPONSES TO PHQ QUESTIONS 1-9: 5
SUM OF ALL RESPONSES TO PHQ QUESTIONS 1-9: 5
10. IF YOU CHECKED OFF ANY PROBLEMS, HOW DIFFICULT HAVE THESE PROBLEMS MADE IT FOR YOU TO DO YOUR WORK, TAKE CARE OF THINGS AT HOME, OR GET ALONG WITH OTHER PEOPLE: SOMEWHAT DIFFICULT

## 2024-11-19 NOTE — PROGRESS NOTES
Preventive Care Visit  [facility]  Bernadette Chavez, ARIANNA, Internal Medicine  Nov 19, 2024      Assessment & Plan     Encounter for preventive health examination  Counseling: counseling provided regarding nutrition, regular exercise, general safety and periodic health exams   Pap is up to date  Tdap updated today  Lab work today    - Comprehensive metabolic panel (BMP + Alb, Alk Phos, ALT, AST, Total. Bili, TP); Future  - Lipid panel reflex to direct LDL Fasting; Future  - TSH; Future  - T3, Free; Future  - T4, free; Future  - Vitamin D Deficiency; Future  - DHEA sulfate; Future  - Testosterone, total; Future  - Estradiol; Future  - Progesterone; Future  - CBC with platelets and differential; Future  - Hemoglobin A1c; Future  - Comprehensive metabolic panel (BMP + Alb, Alk Phos, ALT, AST, Total. Bili, TP)  - Lipid panel reflex to direct LDL Fasting  - TSH  - T3, Free  - T4, free  - Vitamin D Deficiency  - DHEA sulfate  - Testosterone, total  - Estradiol  - Progesterone  - CBC with platelets and differential  - Hemoglobin A1c    Weight gain  -check lab work, likely related to psych medications    - Comprehensive metabolic panel (BMP + Alb, Alk Phos, ALT, AST, Total. Bili, TP); Future  - Lipid panel reflex to direct LDL Fasting; Future  - TSH; Future  - T3, Free; Future  - T4, free; Future  - Vitamin D Deficiency; Future  - DHEA sulfate; Future  - Testosterone, total; Future  - Estradiol; Future  - Progesterone; Future  - CBC with platelets and differential; Future  - Hemoglobin A1c; Future  - Comprehensive metabolic panel (BMP + Alb, Alk Phos, ALT, AST, Total. Bili, TP)  - Lipid panel reflex to direct LDL Fasting  - TSH  - T3, Free  - T4, free  - Vitamin D Deficiency  - DHEA sulfate  - Testosterone, total  - Estradiol  - Progesterone  - CBC with platelets and differential  - Hemoglobin A1c    Irregular menstrual cycle  -check labs    - TSH; Future  - T3, Free; Future  - T4, free; Future  -  "Vitamin D Deficiency; Future  - DHEA sulfate; Future  - Testosterone, total; Future  - Estradiol; Future  - Progesterone; Future  - CBC with platelets and differential; Future  - Hemoglobin A1c; Future  - TSH  - T3, Free  - T4, free  - Vitamin D Deficiency  - DHEA sulfate  - Testosterone, total  - Estradiol  - Progesterone  - CBC with platelets and differential  - Hemoglobin A1c    Anxiety and depression  -stable, sees psych, discussed asking psych provider to see if they can do gene testing for medications          BMI  Estimated body mass index is 29.37 kg/m  as calculated from the following:    Height as of this encounter: 1.665 m (5' 5.55\").    Weight as of this encounter: 81.4 kg (179 lb 8 oz).       Counseling  Appropriate preventive services were addressed with this patient via screening, questionnaire, or discussion as appropriate for fall prevention, nutrition, physical activity, Tobacco-use cessation, social engagement, weight loss and cognition.  Checklist reviewing preventive services available has been given to the patient.  Reviewed patient's diet, addressing concerns and/or questions.   She is at risk for lack of exercise and has been provided with information to increase physical activity for the benefit of her well-being.   The patient's PHQ-9 score is consistent with mild depression. She was provided with information regarding depression.           Regine Truong is a 33 year old, presenting for the following:  Physical        11/19/2024    11:12 AM   Additional Questions   Roomed by Benita Zhou MA   Accompanied by self         11/19/2024    11:12 AM   Patient Reported Additional Medications   Patient reports taking the following new medications None          HPI  Patient presents to the clinic today for annual physical.  She is requesting some lab test to be done, she is curious about possibly having PCOS.  She does report she had an IUD in the past and it was taken out over a year ago.  She " did have regular cycles but most recent 1 was a little bit irregular.  She is also noticed some weight gain and weight loss which she attributes to some of her psych meds, they have been trying to change and transition her to psych meds over the past year.  She does see psychiatry.  Her last menstrual period started on October 20.  She otherwise reports she seems to be doing okay, her mental health seems to be doing better.        Health Care Directive  Patient does not have a Health Care Directive: Discussed advance care planning with patient; however, patient declined at this time.      11/19/2024   General Health   How would you rate your overall physical health? (!) POOR   Feel stress (tense, anxious, or unable to sleep) Only a little      (!) STRESS CONCERN      11/19/2024   Nutrition   Three or more servings of calcium each day? (!) NO   Diet: Regular (no restrictions)   How many servings of fruit and vegetables per day? (!) 0-1   How many sweetened beverages each day? 0-1            11/19/2024   Exercise   Days per week of moderate/strenous exercise 1 day      (!) EXERCISE CONCERN      11/19/2024   Social Factors   Frequency of gathering with friends or relatives Twice a week   Worry food won't last until get money to buy more No   Food not last or not have enough money for food? No   Do you have housing? (Housing is defined as stable permanent housing and does not include staying ouside in a car, in a tent, in an abandoned building, in an overnight shelter, or couch-surfing.) Yes   Are you worried about losing your housing? No   Lack of transportation? No   Unable to get utilities (heat,electricity)? No            11/19/2024   Dental   Dentist two times every year? Yes            11/19/2024   TB Screening   Were you born outside of the [place]? No          Today's PHQ-9 Score:       11/19/2024    10:58 AM   PHQ-9 SCORE   PHQ-9 Total Score MyChart 5 (Mild depression)   PHQ-9 Total Score 5        Patient-reported  "        11/19/2024   Substance Use   Alcohol more than 3/day or more than 7/wk No   Do you use any other substances recreationally? (!) CANNABIS PRODUCTS        Social History     Tobacco Use    Smoking status: Never    Smokeless tobacco: Never   Vaping Use    Vaping status: Never Used   Substance Use Topics    Alcohol use: Yes     Comment: 1 x per month     Drug use: No          Mammogram Screening - Patient under 40 years of age: Routine Mammogram Screening not recommended.         11/19/2024   STI Screening   New sexual partner(s) since last STI/HIV test? No        History of abnormal Pap smear: No - age 30- 62 PAP with HPV every 5 years recommended        Latest Ref Rng & Units 1/20/2023     2:20 PM 10/24/2017     9:05 AM   PAP / HPV   PAP  Negative for Intraepithelial Lesion or Malignancy (NILM)     PAP (Historical)   NIL    HPV 16 DNA Negative Negative     HPV 18 DNA Negative Negative     Other HR HPV Negative Negative             11/19/2024   Contraception/Family Planning   Questions about contraception or family planning No           Reviewed and updated as needed this visit by Provider                             Objective    Exam  /84 (BP Location: Right arm, Patient Position: Sitting, Cuff Size: Adult Regular)   Pulse 86   Temp 97.6  F (36.4  C) (Oral)   Resp 17   Ht 1.665 m (5' 5.55\")   Wt 81.4 kg (179 lb 8 oz)   LMP 10/20/2024 (Approximate)   SpO2 100%   Breastfeeding No   BMI 29.37 kg/m     Estimated body mass index is 29.37 kg/m  as calculated from the following:    Height as of this encounter: 1.665 m (5' 5.55\").    Weight as of this encounter: 81.4 kg (179 lb 8 oz).    Physical Exam  Vitals and nursing note reviewed.   Constitutional:       General: She is not in acute distress.     Appearance: Normal appearance. She is not ill-appearing.   HENT:      Head: Normocephalic and atraumatic.      Right Ear: Tympanic membrane, ear canal and external ear normal.      Left Ear: Tympanic " membrane, ear canal and external ear normal.      Nose: Nose normal.      Mouth/Throat:      Mouth: Mucous membranes are moist.      Pharynx: Oropharynx is clear.   Eyes:      Extraocular Movements: Extraocular movements intact.      Conjunctiva/sclera: Conjunctivae normal.      Pupils: Pupils are equal, round, and reactive to light.   Cardiovascular:      Rate and Rhythm: Normal rate and regular rhythm.      Pulses: Normal pulses.      Heart sounds: Normal heart sounds.   Pulmonary:      Effort: Pulmonary effort is normal. No respiratory distress.      Breath sounds: Normal breath sounds. No wheezing.   Abdominal:      General: Bowel sounds are normal. There is no distension.      Palpations: Abdomen is soft. There is no mass.      Tenderness: There is no abdominal tenderness. There is no guarding.   Musculoskeletal:         General: Normal range of motion.      Cervical back: Normal range of motion.   Skin:     General: Skin is warm and dry.   Neurological:      General: No focal deficit present.      Mental Status: She is alert and oriented to person, place, and time. Mental status is at baseline.      Cranial Nerves: No cranial nerve deficit.      Motor: No weakness.   Psychiatric:         Mood and Affect: Mood normal.         Behavior: Behavior normal.             Signed Electronically by: Bernadette Chavez CNP    Answers submitted by the patient for this visit:  Patient Health Questionnaire (Submitted on 11/19/2024)  If you checked off any problems, how difficult have these problems made it for you to do your work, take care of things at home, or get along with other people?: Somewhat difficult  PHQ9 TOTAL SCORE: 5     details…

## 2024-11-20 LAB
ALBUMIN SERPL BCG-MCNC: 4.8 G/DL (ref 3.5–5.2)
ALP SERPL-CCNC: 80 U/L (ref 40–150)
ALT SERPL W P-5'-P-CCNC: 25 U/L (ref 0–50)
ANION GAP SERPL CALCULATED.3IONS-SCNC: 15 MMOL/L (ref 7–15)
AST SERPL W P-5'-P-CCNC: 22 U/L (ref 0–45)
BILIRUB SERPL-MCNC: 0.4 MG/DL
BUN SERPL-MCNC: 10.1 MG/DL (ref 6–20)
CALCIUM SERPL-MCNC: 10 MG/DL (ref 8.8–10.4)
CHLORIDE SERPL-SCNC: 104 MMOL/L (ref 98–107)
CHOLEST SERPL-MCNC: 154 MG/DL
CREAT SERPL-MCNC: 0.86 MG/DL (ref 0.51–0.95)
DHEA-S SERPL-MCNC: 346 UG/DL (ref 35–430)
EGFRCR SERPLBLD CKD-EPI 2021: 90 ML/MIN/1.73M2
ESTRADIOL SERPL-MCNC: 95 PG/ML
FASTING STATUS PATIENT QL REPORTED: YES
FASTING STATUS PATIENT QL REPORTED: YES
GLUCOSE SERPL-MCNC: 90 MG/DL (ref 70–99)
HCO3 SERPL-SCNC: 25 MMOL/L (ref 22–29)
HDLC SERPL-MCNC: 48 MG/DL
LDLC SERPL CALC-MCNC: 85 MG/DL
NONHDLC SERPL-MCNC: 106 MG/DL
POTASSIUM SERPL-SCNC: 4.7 MMOL/L (ref 3.4–5.3)
PROGEST SERPL-MCNC: 1.8 NG/ML
PROT SERPL-MCNC: 7.7 G/DL (ref 6.4–8.3)
SODIUM SERPL-SCNC: 144 MMOL/L (ref 135–145)
T3FREE SERPL-MCNC: 3.1 PG/ML (ref 2–4.4)
T4 FREE SERPL-MCNC: 1.07 NG/DL (ref 0.9–1.7)
TRIGL SERPL-MCNC: 104 MG/DL
TSH SERPL DL<=0.005 MIU/L-ACNC: 1.36 UIU/ML (ref 0.3–4.2)
VIT D+METAB SERPL-MCNC: 48 NG/ML (ref 20–50)

## 2024-11-21 LAB — TESTOST SERPL-MCNC: 51 NG/DL (ref 8–60)
